# Patient Record
Sex: FEMALE | Race: OTHER | Employment: OTHER | URBAN - METROPOLITAN AREA
[De-identification: names, ages, dates, MRNs, and addresses within clinical notes are randomized per-mention and may not be internally consistent; named-entity substitution may affect disease eponyms.]

---

## 2017-04-05 ENCOUNTER — APPOINTMENT (OUTPATIENT)
Dept: GENERAL RADIOLOGY | Age: 62
DRG: 438 | End: 2017-04-05
Payer: COMMERCIAL

## 2017-04-05 ENCOUNTER — APPOINTMENT (OUTPATIENT)
Dept: ULTRASOUND IMAGING | Age: 62
DRG: 438 | End: 2017-04-05
Payer: COMMERCIAL

## 2017-04-05 ENCOUNTER — HOSPITAL ENCOUNTER (INPATIENT)
Age: 62
LOS: 3 days | Discharge: HOME | DRG: 438 | End: 2017-04-08
Attending: EMERGENCY MEDICINE | Admitting: FAMILY MEDICINE
Payer: COMMERCIAL

## 2017-04-05 DIAGNOSIS — K85.80 OTHER ACUTE PANCREATITIS: Primary | ICD-10-CM

## 2017-04-05 PROBLEM — K85.90 PANCREATITIS, ACUTE: Status: ACTIVE | Noted: 2017-04-05

## 2017-04-05 PROBLEM — E78.5 HYPERLIPIDEMIA: Chronic | Status: ACTIVE | Noted: 2017-04-05

## 2017-04-05 LAB
ALBUMIN SERPL BCP-MCNC: 3.5 G/DL (ref 3.2–4.6)
ALBUMIN SERPL BCP-MCNC: 4.1 G/DL (ref 3.2–4.6)
ALBUMIN/GLOB SERPL: 0.9 {RATIO} (ref 1.2–3.5)
ALBUMIN/GLOB SERPL: 1 {RATIO} (ref 1.2–3.5)
ALP SERPL-CCNC: 60 U/L (ref 50–136)
ALP SERPL-CCNC: 67 U/L (ref 50–136)
ALT SERPL-CCNC: 37 U/L (ref 12–65)
ALT SERPL-CCNC: 46 U/L (ref 12–65)
ANION GAP BLD CALC-SCNC: 11 MMOL/L (ref 7–16)
ANION GAP BLD CALC-SCNC: 9 MMOL/L (ref 7–16)
AST SERPL W P-5'-P-CCNC: 33 U/L (ref 15–37)
AST SERPL W P-5'-P-CCNC: 50 U/L (ref 15–37)
BACTERIA SPEC CULT: NEGATIVE
BACTERIA SPEC CULT: NORMAL
BASOPHILS # BLD AUTO: 0 K/UL (ref 0–0.2)
BASOPHILS # BLD AUTO: 0 K/UL (ref 0–0.2)
BASOPHILS # BLD: 0 % (ref 0–2)
BASOPHILS # BLD: 0 % (ref 0–2)
BILIRUB SERPL-MCNC: 0.2 MG/DL (ref 0.2–1.1)
BILIRUB SERPL-MCNC: 0.3 MG/DL (ref 0.2–1.1)
BUN SERPL-MCNC: 17 MG/DL (ref 8–23)
BUN SERPL-MCNC: 30 MG/DL (ref 8–23)
CALCIUM SERPL-MCNC: 8.3 MG/DL (ref 8.3–10.4)
CALCIUM SERPL-MCNC: 9.5 MG/DL (ref 8.3–10.4)
CHLORIDE SERPL-SCNC: 102 MMOL/L (ref 98–107)
CHLORIDE SERPL-SCNC: 106 MMOL/L (ref 98–107)
CO2 SERPL-SCNC: 24 MMOL/L (ref 21–32)
CO2 SERPL-SCNC: 25 MMOL/L (ref 21–32)
CREAT SERPL-MCNC: 0.63 MG/DL (ref 0.6–1)
CREAT SERPL-MCNC: 0.65 MG/DL (ref 0.6–1)
DIFFERENTIAL METHOD BLD: ABNORMAL
DIFFERENTIAL METHOD BLD: ABNORMAL
EOSINOPHIL # BLD: 0 K/UL (ref 0–0.8)
EOSINOPHIL # BLD: 0.1 K/UL (ref 0–0.8)
EOSINOPHIL NFR BLD: 0 % (ref 0.5–7.8)
EOSINOPHIL NFR BLD: 2 % (ref 0.5–7.8)
ERYTHROCYTE [DISTWIDTH] IN BLOOD BY AUTOMATED COUNT: 14.1 % (ref 11.9–14.6)
ERYTHROCYTE [DISTWIDTH] IN BLOOD BY AUTOMATED COUNT: 14.2 % (ref 11.9–14.6)
GLOBULIN SER CALC-MCNC: 3.8 G/DL (ref 2.3–3.5)
GLOBULIN SER CALC-MCNC: 4.2 G/DL (ref 2.3–3.5)
GLUCOSE SERPL-MCNC: 115 MG/DL (ref 65–100)
GLUCOSE SERPL-MCNC: 127 MG/DL (ref 65–100)
HCT VFR BLD AUTO: 35.7 % (ref 35.8–46.3)
HCT VFR BLD AUTO: 39.9 % (ref 35.8–46.3)
HGB BLD-MCNC: 11.6 G/DL (ref 11.7–15.4)
HGB BLD-MCNC: 12.9 G/DL (ref 11.7–15.4)
IMM GRANULOCYTES # BLD: 0 K/UL (ref 0–0.5)
IMM GRANULOCYTES # BLD: 0 K/UL (ref 0–0.5)
IMM GRANULOCYTES NFR BLD AUTO: 0.2 % (ref 0–5)
IMM GRANULOCYTES NFR BLD AUTO: 0.2 % (ref 0–5)
LACTATE SERPL-SCNC: 0.8 MMOL/L (ref 0.4–2)
LIPASE SERPL-CCNC: 1203 U/L (ref 73–393)
LIPASE SERPL-CCNC: 204 U/L (ref 73–393)
LYMPHOCYTES # BLD AUTO: 10 % (ref 13–44)
LYMPHOCYTES # BLD AUTO: 10 % (ref 13–44)
LYMPHOCYTES # BLD: 0.6 K/UL (ref 0.5–4.6)
LYMPHOCYTES # BLD: 0.6 K/UL (ref 0.5–4.6)
MCH RBC QN AUTO: 28.6 PG (ref 26.1–32.9)
MCH RBC QN AUTO: 28.6 PG (ref 26.1–32.9)
MCHC RBC AUTO-ENTMCNC: 32.3 G/DL (ref 31.4–35)
MCHC RBC AUTO-ENTMCNC: 32.5 G/DL (ref 31.4–35)
MCV RBC AUTO: 88.1 FL (ref 79.6–97.8)
MCV RBC AUTO: 88.5 FL (ref 79.6–97.8)
MONOCYTES # BLD: 0.4 K/UL (ref 0.1–1.3)
MONOCYTES # BLD: 0.4 K/UL (ref 0.1–1.3)
MONOCYTES NFR BLD AUTO: 6 % (ref 4–12)
MONOCYTES NFR BLD AUTO: 7 % (ref 4–12)
NEUTS SEG # BLD: 4.8 K/UL (ref 1.7–8.2)
NEUTS SEG # BLD: 5.3 K/UL (ref 1.7–8.2)
NEUTS SEG NFR BLD AUTO: 81 % (ref 43–78)
NEUTS SEG NFR BLD AUTO: 84 % (ref 43–78)
PLATELET # BLD AUTO: 289 K/UL (ref 150–450)
PLATELET # BLD AUTO: 323 K/UL (ref 150–450)
PMV BLD AUTO: 10.7 FL (ref 10.8–14.1)
PMV BLD AUTO: 11.2 FL (ref 10.8–14.1)
POTASSIUM SERPL-SCNC: 3.4 MMOL/L (ref 3.5–5.1)
POTASSIUM SERPL-SCNC: 4.1 MMOL/L (ref 3.5–5.1)
PROCALCITONIN SERPL-MCNC: <0.1 NG/ML
PROT SERPL-MCNC: 7.3 G/DL (ref 6.3–8.2)
PROT SERPL-MCNC: 8.3 G/DL (ref 6.3–8.2)
RBC # BLD AUTO: 4.05 M/UL (ref 4.05–5.25)
RBC # BLD AUTO: 4.51 M/UL (ref 4.05–5.25)
SERVICE CMNT-IMP: NORMAL
SODIUM SERPL-SCNC: 137 MMOL/L (ref 136–145)
SODIUM SERPL-SCNC: 140 MMOL/L (ref 136–145)
WBC # BLD AUTO: 5.9 K/UL (ref 4.3–11.1)
WBC # BLD AUTO: 6.4 K/UL (ref 4.3–11.1)

## 2017-04-05 PROCEDURE — 85025 COMPLETE CBC W/AUTO DIFF WBC: CPT | Performed by: EMERGENCY MEDICINE

## 2017-04-05 PROCEDURE — 65270000029 HC RM PRIVATE

## 2017-04-05 PROCEDURE — 96375 TX/PRO/DX INJ NEW DRUG ADDON: CPT | Performed by: EMERGENCY MEDICINE

## 2017-04-05 PROCEDURE — 76700 US EXAM ABDOM COMPLETE: CPT

## 2017-04-05 PROCEDURE — 74011250636 HC RX REV CODE- 250/636

## 2017-04-05 PROCEDURE — 74011250636 HC RX REV CODE- 250/636: Performed by: EMERGENCY MEDICINE

## 2017-04-05 PROCEDURE — 99284 EMERGENCY DEPT VISIT MOD MDM: CPT | Performed by: EMERGENCY MEDICINE

## 2017-04-05 PROCEDURE — 84145 PROCALCITONIN (PCT): CPT

## 2017-04-05 PROCEDURE — 83605 ASSAY OF LACTIC ACID: CPT

## 2017-04-05 PROCEDURE — 80053 COMPREHEN METABOLIC PANEL: CPT

## 2017-04-05 PROCEDURE — 96374 THER/PROPH/DIAG INJ IV PUSH: CPT | Performed by: EMERGENCY MEDICINE

## 2017-04-05 PROCEDURE — 87040 BLOOD CULTURE FOR BACTERIA: CPT

## 2017-04-05 PROCEDURE — 74011000250 HC RX REV CODE- 250: Performed by: EMERGENCY MEDICINE

## 2017-04-05 PROCEDURE — 87493 C DIFF AMPLIFIED PROBE: CPT

## 2017-04-05 PROCEDURE — 71020 XR CHEST PA LAT: CPT

## 2017-04-05 PROCEDURE — 74011250636 HC RX REV CODE- 250/636: Performed by: FAMILY MEDICINE

## 2017-04-05 PROCEDURE — 83690 ASSAY OF LIPASE: CPT | Performed by: EMERGENCY MEDICINE

## 2017-04-05 PROCEDURE — 74011000258 HC RX REV CODE- 258

## 2017-04-05 PROCEDURE — 74011000250 HC RX REV CODE- 250: Performed by: FAMILY MEDICINE

## 2017-04-05 PROCEDURE — 81003 URINALYSIS AUTO W/O SCOPE: CPT | Performed by: EMERGENCY MEDICINE

## 2017-04-05 PROCEDURE — 74011250637 HC RX REV CODE- 250/637: Performed by: FAMILY MEDICINE

## 2017-04-05 PROCEDURE — 83690 ASSAY OF LIPASE: CPT

## 2017-04-05 PROCEDURE — 80053 COMPREHEN METABOLIC PANEL: CPT | Performed by: EMERGENCY MEDICINE

## 2017-04-05 RX ORDER — ASPIRIN 81 MG/1
81 TABLET ORAL DAILY
Status: DISCONTINUED | OUTPATIENT
Start: 2017-04-05 | End: 2017-04-07

## 2017-04-05 RX ORDER — ONDANSETRON 2 MG/ML
4 INJECTION INTRAMUSCULAR; INTRAVENOUS ONCE
Status: COMPLETED | OUTPATIENT
Start: 2017-04-05 | End: 2017-04-05

## 2017-04-05 RX ORDER — ATORVASTATIN CALCIUM 10 MG/1
20 TABLET, FILM COATED ORAL
Status: DISCONTINUED | OUTPATIENT
Start: 2017-04-05 | End: 2017-04-07

## 2017-04-05 RX ORDER — OXYCODONE HYDROCHLORIDE 5 MG/1
10 TABLET ORAL
Status: DISCONTINUED | OUTPATIENT
Start: 2017-04-05 | End: 2017-04-08 | Stop reason: HOSPADM

## 2017-04-05 RX ORDER — MORPHINE SULFATE 10 MG/ML
5 INJECTION, SOLUTION INTRAMUSCULAR; INTRAVENOUS
Status: DISCONTINUED | OUTPATIENT
Start: 2017-04-05 | End: 2017-04-08 | Stop reason: HOSPADM

## 2017-04-05 RX ORDER — ACETAMINOPHEN 325 MG/1
650 TABLET ORAL
Status: DISCONTINUED | OUTPATIENT
Start: 2017-04-05 | End: 2017-04-08 | Stop reason: HOSPADM

## 2017-04-05 RX ORDER — ENOXAPARIN SODIUM 100 MG/ML
40 INJECTION SUBCUTANEOUS EVERY 24 HOURS
Status: DISCONTINUED | OUTPATIENT
Start: 2017-04-05 | End: 2017-04-07

## 2017-04-05 RX ORDER — SODIUM CHLORIDE 0.9 % (FLUSH) 0.9 %
5-10 SYRINGE (ML) INJECTION EVERY 8 HOURS
Status: DISCONTINUED | OUTPATIENT
Start: 2017-04-05 | End: 2017-04-08 | Stop reason: HOSPADM

## 2017-04-05 RX ORDER — VANCOMYCIN HYDROCHLORIDE
1250 ONCE
Status: COMPLETED | OUTPATIENT
Start: 2017-04-05 | End: 2017-04-05

## 2017-04-05 RX ORDER — SODIUM CHLORIDE 9 MG/ML
3000 INJECTION, SOLUTION INTRAVENOUS CONTINUOUS
Status: DISCONTINUED | OUTPATIENT
Start: 2017-04-05 | End: 2017-04-07 | Stop reason: ALTCHOICE

## 2017-04-05 RX ORDER — SODIUM CHLORIDE 0.9 % (FLUSH) 0.9 %
5-10 SYRINGE (ML) INJECTION AS NEEDED
Status: DISCONTINUED | OUTPATIENT
Start: 2017-04-05 | End: 2017-04-08 | Stop reason: HOSPADM

## 2017-04-05 RX ORDER — FENOFIBRATE 160 MG/1
160 TABLET ORAL DAILY
Status: DISCONTINUED | OUTPATIENT
Start: 2017-04-05 | End: 2017-04-07

## 2017-04-05 RX ORDER — FAMOTIDINE 10 MG/ML
20 INJECTION INTRAVENOUS
Status: COMPLETED | OUTPATIENT
Start: 2017-04-05 | End: 2017-04-05

## 2017-04-05 RX ORDER — LEVOTHYROXINE SODIUM 100 UG/1
100 TABLET ORAL
Status: DISCONTINUED | OUTPATIENT
Start: 2017-04-05 | End: 2017-04-08 | Stop reason: HOSPADM

## 2017-04-05 RX ORDER — LORAZEPAM 1 MG/1
1 TABLET ORAL
Status: DISCONTINUED | OUTPATIENT
Start: 2017-04-05 | End: 2017-04-08 | Stop reason: HOSPADM

## 2017-04-05 RX ADMIN — Medication 10 ML: at 21:29

## 2017-04-05 RX ADMIN — FAMOTIDINE 20 MG: 10 INJECTION, SOLUTION INTRAVENOUS at 04:16

## 2017-04-05 RX ADMIN — FENOFIBRATE 160 MG: 160 TABLET ORAL at 09:00

## 2017-04-05 RX ADMIN — ACETAMINOPHEN 650 MG: 325 TABLET, FILM COATED ORAL at 15:20

## 2017-04-05 RX ADMIN — ENOXAPARIN SODIUM 40 MG: 40 INJECTION SUBCUTANEOUS at 07:53

## 2017-04-05 RX ADMIN — LEVOTHYROXINE SODIUM 100 MCG: 100 TABLET ORAL at 07:53

## 2017-04-05 RX ADMIN — ACETAMINOPHEN 650 MG: 325 TABLET, FILM COATED ORAL at 20:13

## 2017-04-05 RX ADMIN — ONDANSETRON 4 MG: 2 INJECTION INTRAMUSCULAR; INTRAVENOUS at 04:16

## 2017-04-05 RX ADMIN — SODIUM CHLORIDE 3000 ML: 900 INJECTION, SOLUTION INTRAVENOUS at 07:52

## 2017-04-05 RX ADMIN — VANCOMYCIN HYDROCHLORIDE 1250 MG: 10 INJECTION, POWDER, LYOPHILIZED, FOR SOLUTION INTRAVENOUS at 19:50

## 2017-04-05 RX ADMIN — ATORVASTATIN CALCIUM 20 MG: 10 TABLET, FILM COATED ORAL at 21:28

## 2017-04-05 RX ADMIN — ASPIRIN 81 MG: 81 TABLET, COATED ORAL at 09:00

## 2017-04-05 RX ADMIN — PIPERACILLIN SODIUM,TAZOBACTAM SODIUM 3.38 G: 3; .375 INJECTION, POWDER, FOR SOLUTION INTRAVENOUS at 19:53

## 2017-04-05 RX ADMIN — SODIUM CHLORIDE 12.5 MG: 9 INJECTION INTRAMUSCULAR; INTRAVENOUS; SUBCUTANEOUS at 10:00

## 2017-04-05 NOTE — IP AVS SNAPSHOT
Current Discharge Medication List  
  
START taking these medications Dose & Instructions Dispensing Information Comments Morning Noon Evening Bedtime  
 cefpodoxime 200 mg tablet Commonly known as:  Mireya Quinones Your last dose was: Your next dose is:    
   
   
 Dose:  200 mg Take 1 Tab by mouth two (2) times a day for 3 days. Quantity:  6 Tab Refills:  0  
     
   
   
   
  
 potassium chloride 20 mEq tablet Commonly known as:  K-DUR, KLOR-CON Your last dose was: Your next dose is:    
   
   
 Dose:  40 mEq Take 2 Tabs by mouth daily. Quantity:  5 Tab Refills:  0 CONTINUE these medications which have NOT CHANGED Dose & Instructions Dispensing Information Comments Morning Noon Evening Bedtime  
 acetaminophen 500 mg tablet Commonly known as:  TYLENOL Your last dose was: Your next dose is: Take  by mouth every six (6) hours as needed for Pain. Refills:  0  
     
   
   
   
  
 aspirin delayed-release 81 mg tablet Commonly known as:  ASPIRIN LOW DOSE Your last dose was: Your next dose is:    
   
   
 Dose:  81 mg Take 1 Tab by mouth daily. Quantity:  90 Tab Refills:  12  
     
   
   
   
  
 atorvastatin 20 mg tablet Commonly known as:  LIPITOR Your last dose was: Your next dose is:    
   
   
 Dose:  20 mg Take 1 Tab by mouth daily. Quantity:  90 Tab Refills:  3  
     
   
   
   
  
 fenofibrate 160 mg tablet Commonly known as:  LOFIBRA Your last dose was: Your next dose is: TAKE 1 TABLET BY MOUTH EVERY DAY Quantity:  90 Tab Refills:  4  
     
   
   
   
  
 levothyroxine 100 mcg tablet Commonly known as:  SYNTHROID Your last dose was: Your next dose is:    
   
   
 Dose:  100 mcg Take 1 Tab by mouth Daily (before breakfast). Quantity:  90 Tab Refills:  3 STOP taking these medications   
 amoxicillin-clavulanate 875-125 mg per tablet Commonly known as:  AUGMENTIN Where to Get Your Medications Information on where to get these meds will be given to you by the nurse or doctor. ! Ask your nurse or doctor about these medications  
  cefpodoxime 200 mg tablet  
 potassium chloride 20 mEq tablet

## 2017-04-05 NOTE — PROGRESS NOTES
Pt's temperature was elevated 101 administered tylenol 650 mg PO per MD order, will continue to monitor.

## 2017-04-05 NOTE — PROGRESS NOTES
Rechecked temp now higher than before 101.7, MD notified no new orders received at this time. Will continue to monitor.

## 2017-04-05 NOTE — PROGRESS NOTES
Pt assessment completed. Alert and oriented. Lungs CTA even and unlabored. Heart sounds regular. Abdomen soft and tender with active bowel sounds. IV present and infusing. PT denies pain needs at this time. Oriented to room, call light close, bed low, breaks on. Pt aware to call for assistance. All needs met at this time. Will continue to monitor.

## 2017-04-05 NOTE — PROGRESS NOTES
Problem: Interdisciplinary Rounds  Goal: Interdisciplinary Rounds  Interdisciplinary team rounds were held 4/5/2017 with the following team members:Care Management, Nursing, Nurse Practitioner, Nutrition and Pharmacy and the patient and child(gabriela). Plan of care discussed. See clinical pathway and/or care plan for interventions and desired outcomes.

## 2017-04-05 NOTE — ED NOTES
TRANSFER - OUT REPORT:    Verbal report given to [facility] rn(name) on [place]  being transferred to 343(unit) for ordered procedure       Report consisted of patients Situation, Background, Assessment and   Recommendations(SBAR). Information from the following report(s) ED Summary, Procedure Summary and Intake/Output was reviewed with the receiving nurse. Lines:       Opportunity for questions and clarification was provided.       Patient transported with:   Registered Nurse

## 2017-04-05 NOTE — H&P
HOSPITALIST H&P/CONSULT  NAME:  Nataly Ahmadi   Age:  59 y.o.  :   1955   MRN:   [id number]  PCP: Dinorah Alicia MD  Treatment Team: Attending Provider: Elaine Galvan MD; Primary Nurse: Holly Felipe RN    No Order     CC: Reason for admission is: pancreatitis    HPI:   Patient case was reviewed with the ER provider prior to seeing the patient. Patient is a 59 y.o. female who presents to the ER due to abdominal pain with n/v.  Started about 7pm and just worsened. She states she could not even keep water down. Has had a cold for a couple days but otherwise in usual state of health. ER w/u shows pancreatitis. She has not had this before. She does not drink alcohol. She does have high cholesterol and is on meds for this. Denies fever/chills. ROS:  All systems have been reviewed and are negative except as stated in HPI or elsewhere. Past Medical History:   Diagnosis Date    Thyroid disease       Past Surgical History:   Procedure Laterality Date    HX GYN      left ovarian cyst    HX ORTHOPAEDIC      right shoulder/ rotator cuff      Social History   Substance Use Topics    Smoking status: Never Smoker    Smokeless tobacco: Never Used    Alcohol use No      Family History   Problem Relation Age of Onset    Heart Disease Brother     Hypertension Mother     Cancer Father      neck/ throat       FH Reviewed and non-contributory to admitting diagnosis    No Known Allergies   Prior to Admission Medications   Prescriptions Last Dose Informant Patient Reported? Taking?   acetaminophen (TYLENOL) 500 mg tablet   Yes No   Sig: Take  by mouth every six (6) hours as needed for Pain.   amoxicillin-clavulanate (AUGMENTIN) 875-125 mg per tablet   No No   Sig: Take 1 Tab by mouth every twelve (12) hours. aspirin delayed-release (ASPIRIN LOW DOSE) 81 mg tablet 2017 at Unknown time  No Yes   Sig: Take 1 Tab by mouth daily.    atorvastatin (LIPITOR) 20 mg tablet 2017 at Unknown time  No Yes   Sig: Take 1 Tab by mouth daily. fenofibrate (LOFIBRA) 160 mg tablet 2017 at Unknown time  No Yes   Sig: TAKE 1 TABLET BY MOUTH EVERY DAY   levothyroxine (SYNTHROID) 100 mcg tablet 2017 at Unknown time  No Yes   Sig: Take 1 Tab by mouth Daily (before breakfast). Facility-Administered Medications: None         Objective:     Visit Vitals    /54    Pulse 94    Temp 98.6 °F (37 °C)    Resp 18    Ht 5' (1.524 m)    Wt 68 kg (150 lb)    SpO2 95%    BMI 29.29 kg/m2      Temp (24hrs), Av.6 °F (37 °C), Min:98.6 °F (37 °C), Max:98.6 °F (37 °C)    Oxygen Therapy  O2 Sat (%): 95 % (17 0420)  Pulse via Oximetry: 91 beats per minute (17 0420)  O2 Device: Room air (17 0327)   Body mass index is 29.29 kg/(m^2). Physical Exam:    General:    WD and WN , No apparent distress. Head:   Normocephalic, without obvious abnormality, atraumatic. Eyes:  PERRL; EOMI  ENT:  Hearing is normal.  Oropharynx is clear with tacky mucous membranes   Resp:    Clear to auscultation bilaterally. No Wheezing or Rhonchi. Resp are even and unlabored  Heart[de-identified]  Regular rate and rhythm,  no murmur, rub or gallop. No LE edema  Abdomen:   Soft, non-tender. Not distended. Bowel sounds normal.  hepato-splenomegaly -none  Musc/SK: Muscle strength and tone normal and appropriate for age and condition. No cyanosis. No clubbing  Skin:     Texture, turgor normal. No significant rashes or lesions. Neurologic: CN II - XII are tested and intact;   Reflexes unobtainable  Psych: Alert and oriented x 4;  Judgement and insight are normal     Data Review:   Recent Results (from the past 24 hour(s))   CBC WITH AUTOMATED DIFF    Collection Time: 17  3:40 AM   Result Value Ref Range    WBC 5.9 4.3 - 11.1 K/uL    RBC 4.51 4.05 - 5.25 M/uL    HGB 12.9 11.7 - 15.4 g/dL    HCT 39.9 35.8 - 46.3 %    MCV 88.5 79.6 - 97.8 FL    MCH 28.6 26.1 - 32.9 PG    MCHC 32.3 31.4 - 35.0 g/dL    RDW 14.2 11.9 - 14.6 %    PLATELET 317 312 - 128 K/uL    MPV 11.2 10.8 - 14.1 FL    DF AUTOMATED      NEUTROPHILS 81 (H) 43 - 78 %    LYMPHOCYTES 10 (L) 13 - 44 %    MONOCYTES 7 4.0 - 12.0 %    EOSINOPHILS 2 0.5 - 7.8 %    BASOPHILS 0 0.0 - 2.0 %    IMMATURE GRANULOCYTES 0.2 0.0 - 5.0 %    ABS. NEUTROPHILS 4.8 1.7 - 8.2 K/UL    ABS. LYMPHOCYTES 0.6 0.5 - 4.6 K/UL    ABS. MONOCYTES 0.4 0.1 - 1.3 K/UL    ABS. EOSINOPHILS 0.1 0.0 - 0.8 K/UL    ABS. BASOPHILS 0.0 0.0 - 0.2 K/UL    ABS. IMM. GRANS. 0.0 0.0 - 0.5 K/UL   METABOLIC PANEL, COMPREHENSIVE    Collection Time: 04/05/17  3:40 AM   Result Value Ref Range    Sodium 137 136 - 145 mmol/L    Potassium 4.1 3.5 - 5.1 mmol/L    Chloride 102 98 - 107 mmol/L    CO2 24 21 - 32 mmol/L    Anion gap 11 7 - 16 mmol/L    Glucose 127 (H) 65 - 100 mg/dL    BUN 17 8 - 23 MG/DL    Creatinine 0.63 0.6 - 1.0 MG/DL    GFR est AA >60 >60 ml/min/1.73m2    GFR est non-AA >60 >60 ml/min/1.73m2    Calcium 9.5 8.3 - 10.4 MG/DL    Bilirubin, total 0.3 0.2 - 1.1 MG/DL    ALT (SGPT) 37 12 - 65 U/L    AST (SGOT) 33 15 - 37 U/L    Alk. phosphatase 67 50 - 136 U/L    Protein, total 8.3 (H) 6.3 - 8.2 g/dL    Albumin 4.1 3.2 - 4.6 g/dL    Globulin 4.2 (H) 2.3 - 3.5 g/dL    A-G Ratio 1.0 (L) 1.2 - 3.5     LIPASE    Collection Time: 04/05/17  3:40 AM   Result Value Ref Range    Lipase 1203 (H) 73 - 393 U/L     CXR Results  (Last 48 hours)    None        CT Results  (Last 48 hours)    None          Assessment and Plan: Active Hospital Problems    Diagnosis Date Noted    Pancreatitis, acute 04/05/2017         · PLAN   · IVF  · NPO except meds and few ice chips  · Cont appropriate home meds (see MAR)  · Control symptoms (pain, n/v, fever, etc)  · Monitor appropriate labs   · Plans discussed with patient and/or caregiver; questions answered.     Med records reviewed if applicable; findings:     Critical care time if applicable:      Signed By: Bobby Tanner MD     April 5, 2017

## 2017-04-05 NOTE — PROGRESS NOTES
Primary Nurse Tasneem Lance and Delfino Hamman, RN performed a dual skin assessment on this patient No impairment noted  Du score is 21

## 2017-04-05 NOTE — PROGRESS NOTES
Pharmacokinetic Consult to Pharmacist    Ra Nik is a 59 y.o. female being treated for sepsis with Vancomycin and Zosyn. @OSTW(44)@  @LIRP34)@  Lab Results   Component Value Date/Time    BUN 17 04/05/2017 03:40 AM    Creatinine 0.63 04/05/2017 03:40 AM    WBC 5.9 04/05/2017 03:40 AM      Estimated Creatinine Clearance: 78.6 mL/min (based on Cr of 0.63). CULTURES:  All Micro Results       Procedure Component Value Units Date/Time      CULTURE, BLOOD [[id number]]     Order Status:  Sent Specimen:  Blood     CULTURE, BLOOD [[id number]]     Order Status:  Sent Specimen:  Blood     C. DIFFICILE/EPI PCR [[id number]] Collected:  04/05/17 1429    Order Status:  Completed Specimen:  Stool Updated:  04/05/17 1522    C. DIFFICILE/EPI PCR [[id number]]     Order Status:  Canceled Specimen:  Stool     CLOSTRIDIUM DIFF TOXIN A & B [[id number]]     Order Status:  Canceled Specimen:  Stool               Day 1 of vancomycin. Goal trough is 15 - 20. Vancomycin dose initiated at 1250 mg IV once, then 1 gram IV every 12 hours. Will continue to follow patient.       Thank you,  Alfred GeeD, BCPS

## 2017-04-05 NOTE — ED PROVIDER NOTES
HPI Comments: Patient states onset of nausea and vomiting at 7 pm. Cramping abdominal pain. Diarrhea - multiple episodes watery stool. No blood in stool or emesis. No urinary symptoms. No fever. Has had URI symptoms for 2 days with cough, hoarse voice, sore throat. History of PUD many years ago. No history of pancreatitis. Has had cholecystectomy and appendectomy. No alcohol or tobacco.     Patient is a 59 y.o. female presenting with vomiting. The history is provided by the patient. Vomiting    This is a new problem. Episode onset: 7 pm. The problem occurs 2 to 4 times per day. The problem has not changed since onset. The emesis has an appearance of stomach contents. There has been no fever. Associated symptoms include abdominal pain, diarrhea, cough and URI. Pertinent negatives include no chills and no fever. The patient is not pregnant. Past Medical History:   Diagnosis Date    Thyroid disease        Past Surgical History:   Procedure Laterality Date    HX GYN      left ovarian cyst    HX ORTHOPAEDIC      right shoulder/ rotator cuff         Family History:   Problem Relation Age of Onset    Heart Disease Brother     Hypertension Mother     Cancer Father      neck/ throat       Social History     Social History    Marital status:      Spouse name: N/A    Number of children: N/A    Years of education: N/A     Occupational History    Not on file. Social History Main Topics    Smoking status: Never Smoker    Smokeless tobacco: Never Used    Alcohol use No    Drug use: No    Sexual activity: Not on file     Other Topics Concern    Not on file     Social History Narrative         ALLERGIES: Review of patient's allergies indicates no known allergies. Review of Systems   Constitutional: Negative. Negative for chills and fever. HENT: Positive for congestion, sore throat and voice change. Eyes: Negative. Respiratory: Positive for cough. Cardiovascular: Negative. Gastrointestinal: Positive for abdominal pain, diarrhea, nausea and vomiting. Genitourinary: Positive for decreased urine volume. Negative for dysuria. Musculoskeletal: Negative. Neurological: Negative. Hematological: Negative. Psychiatric/Behavioral: Negative. Vitals:    04/05/17 0327 04/05/17 0420   BP: 109/51 119/54   Pulse: 94    Resp: 20    Temp: 98.6 °F (37 °C)    SpO2: 98% 95%   Weight: 68 kg (150 lb)    Height: 5' (1.524 m)             Physical Exam   Constitutional: She is oriented to person, place, and time. She appears well-developed and well-nourished. HENT:   Head: Normocephalic and atraumatic. Right Ear: External ear normal.   Left Ear: External ear normal.   MM dry   Eyes: Conjunctivae and EOM are normal. Pupils are equal, round, and reactive to light. No scleral icterus. Neck: Normal range of motion. Neck supple. No JVD present. Cardiovascular: Normal rate, regular rhythm, normal heart sounds and intact distal pulses. Pulmonary/Chest: Effort normal and breath sounds normal.   Abdominal: Soft. Bowel sounds are normal. She exhibits no distension and no mass. There is tenderness (epigastric area). There is no rebound and no guarding. Musculoskeletal: Normal range of motion. She exhibits no edema or tenderness. Neurological: She is alert and oriented to person, place, and time. Skin: Skin is warm and dry. Psychiatric: She has a normal mood and affect. Her behavior is normal.   Nursing note and vitals reviewed.        MDM  ED Course       Procedures    Acute pancreatitis  IVF's 1 liter NS, Zofran 4 mg IV, Pepcid 20 mg IV  Labs reviewed  Has not urinated yet  Consult hospitalist - Dr. Alisha Townsend - will see

## 2017-04-05 NOTE — PROGRESS NOTES
Admission assessment complete via doc flow sheet. Patient is alert and oriented x 4. Respirations even and unlabored on room air. Lung sounds CTA bilaterally. Heart sounds S1, S2 auscultated and regular. Abdomen soft and non tender. Bowel sounds active to all 4 quadrants. Patient reported having diarrhea. IV flushed without difficulty. Skin is warm and dry with ecchymosis noted to right knee. Patient ambulates to bathroom as needed without difficulty. Patient denies pain and other needs at this time. Patient is oriented to room. Bed is locked and in low position. Bed rails x 3. Patient is encouraged to call for assistance. Call light within reach.

## 2017-04-05 NOTE — IP AVS SNAPSHOT
Carley Knight 
 
 
 [address] [place] [address] 
[phone] Patient: Terressa Castleman MRN: [id number] TMA:[id number] You are allergic to the following No active allergies Recent Documentation Height Weight BMI OB Status Smoking Status 1.524 m 68 kg 29.29 kg/m2 Menopause Never Smoker Unresulted Labs Order Current Status CULTURE, BLOOD Preliminary result CULTURE, BLOOD Preliminary result Emergency Contacts Name Discharge Info Relation Home Work Mobile Deborah Turner  Spouse [3]   [phone] About your hospitalization You were admitted on:  April 5, 2017 You last received care in the:  [address] You were discharged on:  April 8, 2017 Unit phone number:  [phone] Why you were hospitalized Your primary diagnosis was:  Pancreatitis, Acute Your diagnoses also included:  Sepsis (Hcc), Uti (Urinary Tract Infection), Hypokalemia, Hypomagnesemia Providers Seen During Your Hospitalizations Provider Role Specialty Primary office phone Myah Hunter MD Attending Provider Emergency Medicine [phone] Hortensia Woodward MD Attending Provider [facility] [phone] Oniel Villatoro DO Attending Provider Internal Medicine [phone] Your Primary Care Physician (PCP) Primary Care Physician Office Phone Office Fax Angela Worley [place] [phone] Follow-up Information Follow up With Details Comments Contact Info Sandrita Gallagher MD In 2 days  [address] [facility] [address]50 [address] [facility] [address] [phone] [facility] In 1 week  [facility] [address] [phone] Current Discharge Medication List  
  
START taking these medications Dose & Instructions Dispensing Information Comments Morning Noon Evening Bedtime  
 cefpodoxime 200 mg tablet Commonly known as:  Daniela Barthel Your last dose was: Your next dose is:    
   
   
 Dose:  200 mg Take 1 Tab by mouth two (2) times a day for 3 days. Quantity:  6 Tab Refills:  0  
     
   
   
   
  
 potassium chloride 20 mEq tablet Commonly known as:  K-DUR, KLOR-CON Your last dose was: Your next dose is:    
   
   
 Dose:  40 mEq Take 2 Tabs by mouth daily. Quantity:  5 Tab Refills:  0 CONTINUE these medications which have NOT CHANGED Dose & Instructions Dispensing Information Comments Morning Noon Evening Bedtime  
 acetaminophen 500 mg tablet Commonly known as:  TYLENOL Your last dose was: Your next dose is: Take  by mouth every six (6) hours as needed for Pain. Refills:  0  
     
   
   
   
  
 aspirin delayed-release 81 mg tablet Commonly known as:  ASPIRIN LOW DOSE Your last dose was: Your next dose is:    
   
   
 Dose:  81 mg Take 1 Tab by mouth daily. Quantity:  90 Tab Refills:  12  
     
   
   
   
  
 atorvastatin 20 mg tablet Commonly known as:  LIPITOR Your last dose was: Your next dose is:    
   
   
 Dose:  20 mg Take 1 Tab by mouth daily. Quantity:  90 Tab Refills:  3  
     
   
   
   
  
 fenofibrate 160 mg tablet Commonly known as:  LOFIBRA Your last dose was: Your next dose is: TAKE 1 TABLET BY MOUTH EVERY DAY Quantity:  90 Tab Refills:  4  
     
   
   
   
  
 levothyroxine 100 mcg tablet Commonly known as:  SYNTHROID Your last dose was: Your next dose is:    
   
   
 Dose:  100 mcg Take 1 Tab by mouth Daily (before breakfast). Quantity:  90 Tab Refills:  3 STOP taking these medications   
 amoxicillin-clavulanate 875-125 mg per tablet Commonly known as:  AUGMENTIN  
   
  
  
  
 Where to Get Your Medications Information on where to get these meds will be given to you by the nurse or doctor. ! Ask your nurse or doctor about these medications  
  cefpodoxime 200 mg tablet  
 potassium chloride 20 mEq tablet Discharge Instructions DISCHARGE SUMMARY from Nurse The following personal items are in your possession at time of discharge: 
 
  
Visual Aid: None Clothing: At bedside PATIENT INSTRUCTIONS: 
 
After general anesthesia or intravenous sedation, for 24 hours or while taking prescription Narcotics: · Limit your activities · Do not drive and operate hazardous machinery · Do not make important personal or business decisions · Do  not drink alcoholic beverages · If you have not urinated within 8 hours after discharge, please contact your surgeon on call. Report the following to your surgeon: 
· Excessive pain, swelling, redness or odor of or around the surgical area · Temperature over 100.5 · Nausea and vomiting lasting longer than 4 hours or if unable to take medications · Any signs of decreased circulation or nerve impairment to extremity: change in color, persistent  numbness, tingling, coldness or increase pain · Any questions What to do at Home: 
Recommended activity: Activity as tolerated If you experience any of the following symptoms please see discharge instructions, please follow up with your primary care MD. 
 
 
*  Please give a list of your current medications to your Primary Care Provider. *  Please update this list whenever your medications are discontinued, doses are 
    changed, or new medications (including over-the-counter products) are added. *  Please carry medication information at all times in case of emergency situations. These are general instructions for a healthy lifestyle: No smoking/ No tobacco products/ Avoid exposure to second hand smoke Surgeon General's Warning:  Quitting smoking now greatly reduces serious risk to your health. Obesity, smoking, and sedentary lifestyle greatly increases your risk for illness A healthy diet, regular physical exercise & weight monitoring are important for maintaining a healthy lifestyle You may be retaining fluid if you have a history of heart failure or if you experience any of the following symptoms:  Weight gain of 3 pounds or more overnight or 5 pounds in a week, increased swelling in our hands or feet or shortness of breath while lying flat in bed. Please call your doctor as soon as you notice any of these symptoms; do not wait until your next office visit. Recognize signs and symptoms of STROKE: 
 
F-face looks uneven A-arms unable to move or move unevenly S-speech slurred or non-existent T-time-call 911 as soon as signs and symptoms begin-DO NOT go Back to bed or wait to see if you get better-TIME IS BRAIN. Warning Signs of HEART ATTACK Call 911 if you have these symptoms: 
? Chest discomfort. Most heart attacks involve discomfort in the center of the chest that lasts more than a few minutes, or that goes away and comes back. It can feel like uncomfortable pressure, squeezing, fullness, or pain. ? Discomfort in other areas of the upper body. Symptoms can include pain or discomfort in one or both arms, the back, neck, jaw, or stomach. ? Shortness of breath with or without chest discomfort. ? Other signs may include breaking out in a cold sweat, nausea, or lightheadedness. Don't wait more than five minutes to call [address]! Fast action can save your life. Calling 911 is almost always the fastest way to get lifesaving treatment. Emergency Medical Services staff can begin treatment when they arrive  up to an hour sooner than if someone gets to the hospital by car. The discharge information has been reviewed with the patient.   The patient verbalized understanding. Discharge medications reviewed with the patient and appropriate educational materials and side effects teaching were provided. Discharge Instructions Attachments/References PANCREATITIS (ENGLISH) DIARRHEA (ENGLISH) UTI (URINARY TRACT INFECTION): FEMALE (ENGLISH) Discharge Orders None [employer] Announcement We are excited to announce that we are making your provider's discharge notes available to you in [employer]. You will see these notes when they are completed and signed by the physician that discharged you from your recent hospital stay. If you have any questions or concerns about any information you see in [employer], please call the Health Information Department where you were seen or reach out to your Primary Care Provider for more information about your plan of care. Introducing [facility]! [address] introduces [employer] patient portal. Now you can access parts of your medical record, email your doctor's office, and request medication refills online. 1. In your internet browser, go to [email or web address] 2. Click on the First Time User? Click Here link in the Sign In box. You will see the New Member Sign Up page. 3. Enter your [employer] Access Code exactly as it appears below. You will not need to use this code after youve completed the sign-up process. If you do not sign up before the expiration date, you must request a new code. · [employer] Access Code: [email or web address] Expires: 7/4/2017  3:13 AM 
 
4. Enter the last four digits of your Social Security Number (xxxx) and Date of Birth (mm/dd/yyyy) as indicated and click Submit. You will be taken to the next sign-up page. 5. Create a Avensot ID. This will be your [employer] login ID and cannot be changed, so think of one that is secure and easy to remember. 6. Create a Avensot password. You can change your password at any time. 7. Enter your Password Reset Question and Answer. This can be used at a later time if you forget your password. 8. Enter your e-mail address. You will receive e-mail notification when new information is available in [address] 9. Click Sign Up. You can now view and download portions of your medical record. 10. Click the Download Summary menu link to download a portable copy of your medical information. If you have questions, please visit the Frequently Asked Questions section of the [employer] website. Remember, [employer] is NOT to be used for urgent needs. For medical emergencies, dial 911. Now available from your iPhone and Android! General Information Please provide this summary of care documentation to your next provider. Patient Signature:  ____________________________________________________________ Date:  ____________________________________________________________  
  
Melo End Provider Signature:  ____________________________________________________________ Date:  ____________________________________________________________ More Information Pancreatitis: Care Instructions Your Care Instructions The pancreas is an organ behind the stomach. It makes hormones and enzymes to help your body digest food. But if these enzymes attack the pancreas, it can get inflamed. This is called pancreatitis. Most cases are caused by gallstones or by heavy alcohol use. If you take care of yourself at home, it will help you get better. It will also help you avoid more problems with your pancreas. Follow-up care is a key part of your treatment and safety. Be sure to make and go to all appointments, and call your doctor if you are having problems. It's also a good idea to know your test results and keep a list of the medicines you take. How can you care for yourself at home? · Drink clear liquids and eat bland foods until you feel better.  Harsh [place]arez foods include rice, dry toast, and crackers. They also include bananas and applesauce. · Eat a low-fat diet until your doctor says your pancreas is healed. · Do not drink alcohol. Tell your doctor if you need help to quit. Counseling, support groups, and sometimes medicines can help you stay sober. · Be safe with medicines. Read and follow all instructions on the label. ¨ If the doctor gave you a prescription medicine for pain, take it as prescribed. ¨ If you are not taking a prescription pain medicine, ask your doctor if you can take an over-the-counter medicine. · If your doctor prescribed antibiotics, take them as directed. Do not stop taking them just because you feel better. You need to take the full course of antibiotics. · Get extra rest until you feel better. To prevent future problems with your pancreas · Do not drink alcohol. · Tell your doctors and pharmacist that you've had pancreatitis. They can help you avoid medicines that may cause this problem again. When should you call for help? Call your doctor now or seek immediate medical care if: 
· You have new or severe belly pain. · You have a new or higher fever. · You can't keep fluid or medicines down. Watch closely for changes in your health, and be sure to contact your doctor if: · The symptoms you had when you first started feeling sick come back. · You do not get better as expected. · You need help to stop drinking alcohol. Where can you learn more? Go to http://maninder-jorge l.info/. Enter O082 in the search box to learn more about \"Pancreatitis: Care Instructions. \" Current as of: August 9, 2016 Content Version: 11.2 © 2645-1091 Sweeten. Care instructions adapted under license by [employer]ill[employer] (which disclaims liability or warranty for this information).  If you have questions about a medical condition or this instruction, always ask your healthcare professional. Eric Sims, [facility] disclaims any warranty or liability for your use of this information. Diarrhea: Care Instructions Your Care Instructions Diarrhea is loose, watery stools (bowel movements). The exact cause is often hard to find. Sometimes diarrhea is your body's way of getting rid of what caused an upset stomach. Viruses, food poisoning, and many medicines can cause diarrhea. Some people get diarrhea in response to emotional stress, anxiety, or certain foods. Almost everyone has diarrhea now and then. It usually isn't serious, and your stools will return to normal soon. The important thing to do is replace the fluids you have lost, so you can prevent dehydration. The doctor has checked you carefully, but problems can develop later. If you notice any problems or new symptoms, get medical treatment right away. Follow-up care is a key part of your treatment and safety. Be sure to make and go to all appointments, and call your doctor if you are having problems. It's also a good idea to know your test results and keep a list of the medicines you take. How can you care for yourself at home? · Watch for signs of dehydration, which means your body has lost too much water. Dehydration is a serious condition and should be treated right away. Signs of dehydration are: 
¨ Increasing thirst and dry eyes and mouth. ¨ Feeling faint or lightheaded. ¨ Darker urine, and a smaller amount of urine than normal. 
· To prevent dehydration, drink plenty of fluids, enough so that your urine is light yellow or clear like water. Choose water and other caffeine-free clear liquids until you feel better. If you have kidney, heart, or liver disease and have to limit fluids, talk with your doctor before you increase the amount of fluids you drink. · Begin eating small amounts of mild foods the next day, if you feel like it. ¨ Try yogurt that has live cultures of Lactobacillus. (Check the label.) ¨ Avoid spicy foods, fruits, alcohol, and caffeine until 48 hours after all symptoms are gone. ¨ Avoid chewing gum that contains sorbitol. ¨ Avoid dairy products (except for yogurt with Lactobacillus) while you have diarrhea and for 3 days after symptoms are gone. · The doctor may recommend that you take over-the-counter medicine, such as loperamide (Imodium), if you still have diarrhea after 6 hours. Read and follow all instructions on the label. Do not use this medicine if you have bloody diarrhea, a high fever, or other signs of serious illness. Call your doctor if you think you are having a problem with your medicine. When should you call for help? Call 911 anytime you think you may need emergency care. For example, call if: 
· You passed out (lost consciousness). · Your stools are maroon or very bloody. Call your doctor now or seek immediate medical care if: 
· You are dizzy or lightheaded, or you feel like you may faint. · Your stools are black and look like tar, or they have streaks of blood. · You have new or worse belly pain. · You have symptoms of dehydration, such as: ¨ Dry eyes and a dry mouth. ¨ Passing only a little dark urine. ¨ Feeling thirstier than usual. 
· You have a new or higher fever. Watch closely for changes in your health, and be sure to contact your doctor if: 
· Your diarrhea is getting worse. · You see pus in the diarrhea. · You are not getting better after 2 days (48 hours). Where can you learn more? Go to http://maninder-jorge l.info/. Enter L137 in the search box to learn more about \"Diarrhea: Care Instructions. \" Current as of: May 27, 2016 Content Version: 11.2 © 0568-6252 [employer]. Care instructions adapted under license by Ritot (which disclaims liability or warranty for this information).  If you have questions about a medical condition or this instruction, always ask your healthcare professional. [employer] disclaims any warranty or liability for your use of this information. Urinary Tract Infection in Women: Care Instructions Your Care Instructions A urinary tract infection, or UTI, is a general term for an infection anywhere between the kidneys and the urethra (where urine comes out). Most UTIs are bladder infections. They often cause pain or burning when you urinate. UTIs are caused by bacteria and can be cured with antibiotics. Be sure to complete your treatment so that the infection goes away. Follow-up care is a key part of your treatment and safety. Be sure to make and go to all appointments, and call your doctor if you are having problems. It's also a good idea to know your test results and keep a list of the medicines you take. How can you care for yourself at home? · Take your antibiotics as directed. Do not stop taking them just because you feel better. You need to take the full course of antibiotics. · Drink extra water and other fluids for the next day or two. This may help wash out the bacteria that are causing the infection. (If you have kidney, heart, or liver disease and have to limit fluids, talk with your doctor before you increase your fluid intake.) · Avoid drinks that are carbonated or have caffeine. They can irritate the bladder. · Urinate often. Try to empty your bladder each time. · To relieve pain, take a hot bath or lay a heating pad set on low over your lower belly or genital area. Never go to sleep with a heating pad in place. To prevent UTIs · Drink plenty of water each day. This helps you urinate often, which clears bacteria from your system. (If you have kidney, heart, or liver disease and have to limit fluids, talk with your doctor before you increase your fluid intake.) · Urinate when you need to. · Urinate right after you have sex. · Change sanitary pads often.  
· Avoid douches, bubble baths, feminine hygiene sprays, and other feminine hygiene products that have deodorants. · After going to the bathroom, wipe from front to back. When should you call for help? Call your doctor now or seek immediate medical care if: · Symptoms such as fever, chills, nausea, or vomiting get worse or appear for the first time. · You have new pain in your back just below your rib cage. This is called flank pain. · There is new blood or pus in your urine. · You have any problems with your antibiotic medicine. Watch closely for changes in your health, and be sure to contact your doctor if: 
· You are not getting better after taking an antibiotic for 2 days. · Your symptoms go away but then come back. Where can you learn more? Go to http://maninder-jorge l.info/. Enter M668 in the search box to learn more about \"Urinary Tract Infection in Women: Care Instructions. \" Current as of: November 28, 2016 Content Version: 11.2 © 6490-1473 Nazar. Care instructions adapted under license by [employer] (which disclaims liability or warranty for this information). If you have questions about a medical condition or this instruction, always ask your healthcare professional. [address] any warranty or liability for your use of this information.

## 2017-04-05 NOTE — PROGRESS NOTES
TRANSFER - IN REPORT:    Verbal report received from Nikki Naik (name) on Timur Tijerina  being received from ED (unit) for routine progression of care      Report consisted of patients Situation, Background, Assessment and   Recommendations(SBAR). Information from the following report(s) SBAR, Kardex, ED Summary, Intake/Output, MAR, Accordion and Recent Results was reviewed with the receiving nurse. Opportunity for questions and clarification was provided. Assessment completed upon patients arrival to unit and care assumed.

## 2017-04-05 NOTE — PROGRESS NOTES
Pt c/o nausea after getting up to use the bathroom. Administered phenergan 12.5 mg Iv per MD order, will continue to monitor.

## 2017-04-05 NOTE — PROGRESS NOTES
Hospitalist Progress Note    2017  Admit Date: 2017  3:26 AM   NAME: Keely Moreland   :  1955   MRN:  [id number]   Attending: Armen Dias MD  PCP:  Shira Levine MD  Treatment Team: Attending Provider: Armen Dias MD    Full Code   SUBJECTIVE:   Keely Moreland is a 59 y.o. female admitted with acute pancreatitis   Today, she reports 5/10 dull upper abdominal pain that is unchanged. She has diarrhea which she reports is chronic and unchanged. She denies cough, dyspnea, or dysuria      Past Medical History:   Diagnosis Date    Thyroid disease      Recent Results (from the past 24 hour(s))   CBC WITH AUTOMATED DIFF    Collection Time: 17  3:40 AM   Result Value Ref Range    WBC 5.9 4.3 - 11.1 K/uL    RBC 4.51 4.05 - 5.25 M/uL    HGB 12.9 11.7 - 15.4 g/dL    HCT 39.9 35.8 - 46.3 %    MCV 88.5 79.6 - 97.8 FL    MCH 28.6 26.1 - 32.9 PG    MCHC 32.3 31.4 - 35.0 g/dL    RDW 14.2 11.9 - 14.6 %    PLATELET 222 112 - 366 K/uL    MPV 11.2 10.8 - 14.1 FL    DF AUTOMATED      NEUTROPHILS 81 (H) 43 - 78 %    LYMPHOCYTES 10 (L) 13 - 44 %    MONOCYTES 7 4.0 - 12.0 %    EOSINOPHILS 2 0.5 - 7.8 %    BASOPHILS 0 0.0 - 2.0 %    IMMATURE GRANULOCYTES 0.2 0.0 - 5.0 %    ABS. NEUTROPHILS 4.8 1.7 - 8.2 K/UL    ABS. LYMPHOCYTES 0.6 0.5 - 4.6 K/UL    ABS. MONOCYTES 0.4 0.1 - 1.3 K/UL    ABS. EOSINOPHILS 0.1 0.0 - 0.8 K/UL    ABS. BASOPHILS 0.0 0.0 - 0.2 K/UL    ABS. IMM.  GRANS. 0.0 0.0 - 0.5 K/UL   METABOLIC PANEL, COMPREHENSIVE    Collection Time: 17  3:40 AM   Result Value Ref Range    Sodium 137 136 - 145 mmol/L    Potassium 4.1 3.5 - 5.1 mmol/L    Chloride 102 98 - 107 mmol/L    CO2 24 21 - 32 mmol/L    Anion gap 11 7 - 16 mmol/L    Glucose 127 (H) 65 - 100 mg/dL    BUN 17 8 - 23 MG/DL    Creatinine 0.63 0.6 - 1.0 MG/DL    GFR est AA >60 >60 ml/min/1.73m2    GFR est non-AA >60 >60 ml/min/1.73m2    Calcium 9.5 8.3 - 10.4 MG/DL    Bilirubin, total 0.3 0.2 - 1.1 MG/DL    ALT (SGPT) 37 12 - 65 U/L    AST (SGOT) 33 15 - 37 U/L    Alk.  phosphatase 67 50 - 136 U/L    Protein, total 8.3 (H) 6.3 - 8.2 g/dL    Albumin 4.1 3.2 - 4.6 g/dL    Globulin 4.2 (H) 2.3 - 3.5 g/dL    A-G Ratio 1.0 (L) 1.2 - 3.5     LIPASE    Collection Time: 04/05/17  3:40 AM   Result Value Ref Range    Lipase 1203 (H) 73 - 393 U/L     No Known Allergies  Current Facility-Administered Medications   Medication Dose Route Frequency Provider Last Rate Last Dose    aspirin delayed-release tablet 81 mg  81 mg Oral DAILY Clara Schwartz MD   81 mg at 04/05/17 0900    atorvastatin (LIPITOR) tablet 20 mg  20 mg Oral QHS Clara Schwartz MD        fenofibrate (LOFIBRA) tablet 160 mg  160 mg Oral DAILY Clara Schwartz MD   160 mg at 04/05/17 0900    levothyroxine (SYNTHROID) tablet 100 mcg  100 mcg Oral ACB Clara Schwartz MD   100 mcg at 04/05/17 0753    0.9% sodium chloride infusion 3,000 mL  3,000 mL IntraVENous CONTINUOUS Clara Schwartz  mL/hr at 04/05/17 0752 3,000 mL at 04/05/17 0752    sodium chloride (NS) flush 5-10 mL  5-10 mL IntraVENous Q8H Clara Schwartz MD        sodium chloride (NS) flush 5-10 mL  5-10 mL IntraVENous PRN Clara Schwartz MD        acetaminophen (TYLENOL) tablet 650 mg  650 mg Oral Q4H PRN Clara Schwartz MD   650 mg at 04/05/17 1520    oxyCODONE IR (ROXICODONE) tablet 10 mg  10 mg Oral Q4H PRN Clara Schwartz MD        [facility]) with saline injection 12.5 mg  12.5 mg IntraVENous Q6H PRN Clara Schwartz MD   12.5 mg at 04/05/17 1000    LORazepam (ATIVAN) tablet 1 mg  1 mg Oral BID PRN Clara Schwartz MD        enoxaparin (LOVENOX) injection 40 mg  40 mg SubCUTAneous Q24H Clara Schwartz MD   40 mg at 04/05/17 0753    morphine injection 5 mg  5 mg IntraVENous Q4H PRN Clara Schwartz MD        piperacillin-tazobactam (ZOSYN) 3.375 g in 0.9% sodium chloride (MBP/ADV) 100 mL MBP  3.375 g IntraVENous Q6H Jesusita Hay Rafael Canseco., PA           Review of Systems negative with exception of pertinent positives noted above  PHYSICAL EXAM     Visit Vitals    /71 (BP 1 Location: Left arm, BP Patient Position: At rest;Supine)    Pulse (!) 107    Temp (!) 101.7 °F (38.7 °C)    Resp 16    Ht 5' (1.524 m)    Wt 68 kg (150 lb)    SpO2 93%    BMI 29.29 kg/m2      Temp (24hrs), Av.7 °F (37.6 °C), Min:98.4 °F (36.9 °C), Max:101.7 °F (38.7 °C)    Oxygen Therapy  O2 Sat (%): 93 % (17 1450)  Pulse via Oximetry: 91 beats per minute (17 0420)  O2 Device: Room air (17 0735)  No intake or output data in the 24 hours ending 17 1650   General: No acute distress    Lungs: CTA  Heart:  Tachycardia, regular   Abdomen: Soft,+BS, epigastric tenderness   Extremities: No edema   Neurologic:  Non-focal       ASSESSMENT      Active Hospital Problems    Diagnosis Date Noted    Pancreatitis, acute 2017   Plan:  Given fever and tachycardia, will obtain UA, BC, CXR, C diff,  Abdominal US and start empiric Zosyn and Vanco. Check CBC, CMP, lactic acid and procalcitonin.  Continue IVF hydration and symptomatic treatment    DVT Prophylaxis: Lovenox   Plan of Care Discussed with: Supervising MD Ama Dias., PA

## 2017-04-06 PROBLEM — A41.9 SEPSIS, UNSPECIFIED: Status: ACTIVE | Noted: 2017-04-06

## 2017-04-06 PROBLEM — E83.42 HYPOMAGNESEMIA: Status: ACTIVE | Noted: 2017-04-06

## 2017-04-06 PROBLEM — N39.0 UTI (URINARY TRACT INFECTION): Status: ACTIVE | Noted: 2017-04-06

## 2017-04-06 PROBLEM — E87.6 HYPOKALEMIA: Status: ACTIVE | Noted: 2017-04-06

## 2017-04-06 LAB
ALBUMIN SERPL BCP-MCNC: 2.9 G/DL (ref 3.2–4.6)
ALBUMIN/GLOB SERPL: 0.9 {RATIO} (ref 1.2–3.5)
ALP SERPL-CCNC: 46 U/L (ref 50–136)
ALT SERPL-CCNC: 37 U/L (ref 12–65)
ANION GAP BLD CALC-SCNC: 10 MMOL/L (ref 7–16)
APPEARANCE UR: ABNORMAL
AST SERPL W P-5'-P-CCNC: 44 U/L (ref 15–37)
BACTERIA URNS QL MICRO: ABNORMAL /HPF
BASOPHILS # BLD AUTO: 0 K/UL (ref 0–0.2)
BASOPHILS # BLD: 0 % (ref 0–2)
BILIRUB SERPL-MCNC: 0.4 MG/DL (ref 0.2–1.1)
BILIRUB UR QL: NEGATIVE
BUN SERPL-MCNC: 16 MG/DL (ref 8–23)
CALCIUM SERPL-MCNC: 8.1 MG/DL (ref 8.3–10.4)
CASTS URNS QL MICRO: ABNORMAL /LPF
CHLORIDE SERPL-SCNC: 106 MMOL/L (ref 98–107)
CHOLEST SERPL-MCNC: 128 MG/DL
CO2 SERPL-SCNC: 25 MMOL/L (ref 21–32)
COLOR UR: YELLOW
CREAT SERPL-MCNC: 0.77 MG/DL (ref 0.6–1)
DIFFERENTIAL METHOD BLD: ABNORMAL
EOSINOPHIL # BLD: 0 K/UL (ref 0–0.8)
EOSINOPHIL NFR BLD: 0 % (ref 0.5–7.8)
EPI CELLS #/AREA URNS HPF: ABNORMAL /HPF
ERYTHROCYTE [DISTWIDTH] IN BLOOD BY AUTOMATED COUNT: 14.6 % (ref 11.9–14.6)
GLOBULIN SER CALC-MCNC: 3.4 G/DL (ref 2.3–3.5)
GLUCOSE SERPL-MCNC: 110 MG/DL (ref 65–100)
GLUCOSE UR STRIP.AUTO-MCNC: NEGATIVE MG/DL
HCT VFR BLD AUTO: 30.7 % (ref 35.8–46.3)
HDLC SERPL-MCNC: 51 MG/DL (ref 40–60)
HDLC SERPL: 2.5 {RATIO}
HGB BLD-MCNC: 9.8 G/DL (ref 11.7–15.4)
HGB UR QL STRIP: ABNORMAL
IMM GRANULOCYTES # BLD: 0 K/UL (ref 0–0.5)
IMM GRANULOCYTES NFR BLD AUTO: 0.3 % (ref 0–5)
KETONES UR QL STRIP.AUTO: NEGATIVE MG/DL
LDLC SERPL CALC-MCNC: 66.8 MG/DL
LEUKOCYTE ESTERASE UR QL STRIP.AUTO: ABNORMAL
LIPASE SERPL-CCNC: 72 U/L (ref 73–393)
LIPID PROFILE,FLP: NORMAL
LYMPHOCYTES # BLD AUTO: 15 % (ref 13–44)
LYMPHOCYTES # BLD: 1 K/UL (ref 0.5–4.6)
MAGNESIUM SERPL-MCNC: 1.7 MG/DL (ref 1.8–2.4)
MCH RBC QN AUTO: 28.2 PG (ref 26.1–32.9)
MCHC RBC AUTO-ENTMCNC: 31.9 G/DL (ref 31.4–35)
MCV RBC AUTO: 88.5 FL (ref 79.6–97.8)
MONOCYTES # BLD: 0.2 K/UL (ref 0.1–1.3)
MONOCYTES NFR BLD AUTO: 4 % (ref 4–12)
NEUTS SEG # BLD: 5.5 K/UL (ref 1.7–8.2)
NEUTS SEG NFR BLD AUTO: 81 % (ref 43–78)
NITRITE UR QL STRIP.AUTO: NEGATIVE
PH UR STRIP: 5 [PH] (ref 5–9)
PHOSPHATE SERPL-MCNC: 4.3 MG/DL (ref 2.3–3.7)
PLATELET # BLD AUTO: 210 K/UL (ref 150–450)
PMV BLD AUTO: 11 FL (ref 10.8–14.1)
POTASSIUM SERPL-SCNC: 3.3 MMOL/L (ref 3.5–5.1)
PROT SERPL-MCNC: 6.3 G/DL (ref 6.3–8.2)
PROT UR STRIP-MCNC: 30 MG/DL
RBC # BLD AUTO: 3.47 M/UL (ref 4.05–5.25)
RBC #/AREA URNS HPF: ABNORMAL /HPF
SODIUM SERPL-SCNC: 141 MMOL/L (ref 136–145)
SP GR UR REFRACTOMETRY: 1.03 (ref 1–1.02)
TRIGL SERPL-MCNC: 51 MG/DL (ref 35–150)
UROBILINOGEN UR QL STRIP.AUTO: 0.2 EU/DL (ref 0.2–1)
VLDLC SERPL CALC-MCNC: 10.2 MG/DL (ref 7–27)
WBC # BLD AUTO: 6.8 K/UL (ref 4.3–11.1)
WBC URNS QL MICRO: ABNORMAL /HPF

## 2017-04-06 PROCEDURE — 74011250637 HC RX REV CODE- 250/637: Performed by: FAMILY MEDICINE

## 2017-04-06 PROCEDURE — 81001 URINALYSIS AUTO W/SCOPE: CPT

## 2017-04-06 PROCEDURE — 83735 ASSAY OF MAGNESIUM: CPT | Performed by: FAMILY MEDICINE

## 2017-04-06 PROCEDURE — 74011250637 HC RX REV CODE- 250/637

## 2017-04-06 PROCEDURE — 74011250636 HC RX REV CODE- 250/636

## 2017-04-06 PROCEDURE — 65270000029 HC RM PRIVATE

## 2017-04-06 PROCEDURE — 80053 COMPREHEN METABOLIC PANEL: CPT | Performed by: FAMILY MEDICINE

## 2017-04-06 PROCEDURE — 74011000258 HC RX REV CODE- 258

## 2017-04-06 PROCEDURE — 84100 ASSAY OF PHOSPHORUS: CPT | Performed by: FAMILY MEDICINE

## 2017-04-06 PROCEDURE — 74011250636 HC RX REV CODE- 250/636: Performed by: FAMILY MEDICINE

## 2017-04-06 PROCEDURE — 87086 URINE CULTURE/COLONY COUNT: CPT

## 2017-04-06 PROCEDURE — 83690 ASSAY OF LIPASE: CPT | Performed by: FAMILY MEDICINE

## 2017-04-06 PROCEDURE — 80061 LIPID PANEL: CPT | Performed by: FAMILY MEDICINE

## 2017-04-06 PROCEDURE — 36415 COLL VENOUS BLD VENIPUNCTURE: CPT | Performed by: FAMILY MEDICINE

## 2017-04-06 PROCEDURE — 85025 COMPLETE CBC W/AUTO DIFF WBC: CPT | Performed by: FAMILY MEDICINE

## 2017-04-06 RX ORDER — MAGNESIUM SULFATE HEPTAHYDRATE 40 MG/ML
2 INJECTION, SOLUTION INTRAVENOUS ONCE
Status: COMPLETED | OUTPATIENT
Start: 2017-04-06 | End: 2017-04-06

## 2017-04-06 RX ORDER — POTASSIUM CHLORIDE 20 MEQ/1
40 TABLET, EXTENDED RELEASE ORAL
Status: COMPLETED | OUTPATIENT
Start: 2017-04-06 | End: 2017-04-06

## 2017-04-06 RX ADMIN — VANCOMYCIN HYDROCHLORIDE 1000 MG: 1 INJECTION, POWDER, LYOPHILIZED, FOR SOLUTION INTRAVENOUS at 05:30

## 2017-04-06 RX ADMIN — PIPERACILLIN SODIUM,TAZOBACTAM SODIUM 3.38 G: 3; .375 INJECTION, POWDER, FOR SOLUTION INTRAVENOUS at 12:46

## 2017-04-06 RX ADMIN — SODIUM CHLORIDE 1000 ML: 900 INJECTION, SOLUTION INTRAVENOUS at 14:07

## 2017-04-06 RX ADMIN — LEVOTHYROXINE SODIUM 100 MCG: 100 TABLET ORAL at 08:08

## 2017-04-06 RX ADMIN — POTASSIUM CHLORIDE 40 MEQ: 20 TABLET, EXTENDED RELEASE ORAL at 19:33

## 2017-04-06 RX ADMIN — ASPIRIN 81 MG: 81 TABLET, COATED ORAL at 08:08

## 2017-04-06 RX ADMIN — ACETAMINOPHEN 650 MG: 325 TABLET, FILM COATED ORAL at 02:39

## 2017-04-06 RX ADMIN — ATORVASTATIN CALCIUM 20 MG: 10 TABLET, FILM COATED ORAL at 21:45

## 2017-04-06 RX ADMIN — OXYCODONE HYDROCHLORIDE 10 MG: 5 TABLET ORAL at 02:39

## 2017-04-06 RX ADMIN — Medication 10 ML: at 05:33

## 2017-04-06 RX ADMIN — FENOFIBRATE 160 MG: 160 TABLET ORAL at 08:08

## 2017-04-06 RX ADMIN — VANCOMYCIN HYDROCHLORIDE 1000 MG: 1 INJECTION, POWDER, LYOPHILIZED, FOR SOLUTION INTRAVENOUS at 17:08

## 2017-04-06 RX ADMIN — ACETAMINOPHEN 650 MG: 325 TABLET, FILM COATED ORAL at 09:46

## 2017-04-06 RX ADMIN — PIPERACILLIN SODIUM,TAZOBACTAM SODIUM 3.38 G: 3; .375 INJECTION, POWDER, FOR SOLUTION INTRAVENOUS at 04:33

## 2017-04-06 RX ADMIN — Medication 10 ML: at 21:46

## 2017-04-06 RX ADMIN — MAGNESIUM SULFATE IN WATER 2 G: 40 INJECTION, SOLUTION INTRAVENOUS at 19:32

## 2017-04-06 RX ADMIN — PIPERACILLIN SODIUM,TAZOBACTAM SODIUM 3.38 G: 3; .375 INJECTION, POWDER, FOR SOLUTION INTRAVENOUS at 20:47

## 2017-04-06 RX ADMIN — ENOXAPARIN SODIUM 40 MG: 40 INJECTION SUBCUTANEOUS at 08:08

## 2017-04-06 RX ADMIN — SODIUM CHLORIDE 1000 ML: 900 INJECTION, SOLUTION INTRAVENOUS at 08:13

## 2017-04-06 RX ADMIN — OXYCODONE HYDROCHLORIDE 10 MG: 5 TABLET ORAL at 14:04

## 2017-04-06 NOTE — PROGRESS NOTES
Problem: Interdisciplinary Rounds  Goal: Interdisciplinary Rounds  Interdisciplinary team rounds were held 4/6/2017 with the following team members:Care Management, Nursing, Nurse Practitioner, Nutrition, Patient Relations and Pharmacy     Plan of care discussed. See clinical pathway and/or care plan for interventions and desired outcomes.

## 2017-04-06 NOTE — PROGRESS NOTES
Pharmacokinetic Consult to Pharmacist    María Dina is a 59 y.o. female being treated for sepsis with Vancomycin and Zosyn. [unfilled]  @MICHELLE(19)@  Lab Results   Component Value Date/Time    BUN 16 04/06/2017 06:23 AM    Creatinine 0.77 04/06/2017 06:23 AM    WBC 6.8 04/06/2017 06:23 AM    Procalcitonin <0.1 04/05/2017 07:02 PM      Estimated Creatinine Clearance: 64.3 mL/min (based on Cr of 0.77). CULTURES:  All Micro Results       Procedure Component Value Units Date/Time      CULTURE, URINE [[id number]] Collected:  04/06/17 0116    Order Status:  Completed Specimen:  Urine from Clean catch Updated:  04/06/17 1008    CULTURE, BLOOD [[id number]] Collected:  04/05/17 1902    Order Status:  Completed Specimen:  Blood from Blood Updated:  04/06/17 0543     Special Requests: RIGHT ANTECUBITAL        Culture result: NO GROWTH AFTER 10 HOURS       CULTURE, BLOOD [[id number]] Collected:  04/05/17 1908    Order Status:  Completed Specimen:  Blood from Blood Updated:  04/06/17 0543     Special Requests: LEFT ANTECUBITAL        Culture result: NO GROWTH AFTER 10 HOURS       C. DIFFICILE/EPI PCR [[id number]] Collected:  04/05/17 1429    Order Status:  Completed Specimen:  Stool Updated:  04/05/17 1901     Special Requests: NO SPECIAL REQUESTS        Culture result: NEGATIVE                  Toxigenic C. diff NEGATIVE/ 027-NAP1-BI PRESUMPTIVE NEGATIVE    C. DIFFICILE/EPI PCR [[id number]]     Order Status:  Canceled Specimen:  Stool     CLOSTRIDIUM DIFF TOXIN A & B [[id number]]     Order Status:  Canceled Specimen:  Stool               No results found for: Arch Zofia    Day 2 of vancomycin. Goal trough is 15 - 20. Will continue with Vancomycin 1 gram IV every 12 hours. Trough ordered for tomorrow at 5 am.  Will continue to follow patient.       Thank you,  Stephen GeeD, BCPS

## 2017-04-06 NOTE — PROGRESS NOTES
Shift assessment complete. Pt alert and oriented x4, respirations present, even and unlabored, HOB elevated, pt denies any SOB at this time, S1&S2 auscultated, HR regular, abd soft, and nontender, pt having diarrhea, active BS in all 4 quadrants, pt is up with assistance to the bathroom, voiding, IVF infusing without difficulty, No pressure ulcers or edema noted, pt denies any pain at this time, pt instructed to call for assistance, pt verbalizes understanding, bed low and locked, side rails x3, call light within reach, family at the bedside, bed alarm on and functioning properly.

## 2017-04-06 NOTE — PROGRESS NOTES
Primary RN reports a MEW's score of 4 related to HR of 110 and temp of 102. 2. Primary RN to give Tylenol and pain medication and reassess VS. Patient is not in any distress at this time.

## 2017-04-06 NOTE — PROGRESS NOTES
650 mg of TYLENOL given for temp of 102.2    Pt requests medication for pain rated at 7/10, see MAR for administration of ROXICODONE 10 mg PO

## 2017-04-07 ENCOUNTER — APPOINTMENT (OUTPATIENT)
Dept: CT IMAGING | Age: 62
DRG: 438 | End: 2017-04-07
Attending: INTERNAL MEDICINE
Payer: COMMERCIAL

## 2017-04-07 LAB
ALBUMIN SERPL BCP-MCNC: 2.3 G/DL (ref 3.2–4.6)
ALBUMIN/GLOB SERPL: 0.6 {RATIO} (ref 1.2–3.5)
ALP SERPL-CCNC: 78 U/L (ref 50–136)
ALT SERPL-CCNC: 186 U/L (ref 12–65)
ANION GAP BLD CALC-SCNC: 9 MMOL/L (ref 7–16)
AST SERPL W P-5'-P-CCNC: 200 U/L (ref 15–37)
BASOPHILS # BLD AUTO: 0 K/UL (ref 0–0.2)
BASOPHILS # BLD: 0 % (ref 0–2)
BILIRUB SERPL-MCNC: 0.2 MG/DL (ref 0.2–1.1)
BUN SERPL-MCNC: 7 MG/DL (ref 8–23)
CALCIUM SERPL-MCNC: 7 MG/DL (ref 8.3–10.4)
CHLORIDE SERPL-SCNC: 106 MMOL/L (ref 98–107)
CO2 SERPL-SCNC: 25 MMOL/L (ref 21–32)
CREAT SERPL-MCNC: 0.56 MG/DL (ref 0.6–1)
DIFFERENTIAL METHOD BLD: ABNORMAL
EOSINOPHIL # BLD: 0 K/UL (ref 0–0.8)
EOSINOPHIL NFR BLD: 0 % (ref 0.5–7.8)
ERYTHROCYTE [DISTWIDTH] IN BLOOD BY AUTOMATED COUNT: 14.2 % (ref 11.9–14.6)
FERRITIN SERPL-MCNC: 178 NG/ML (ref 8–388)
GLOBULIN SER CALC-MCNC: 3.6 G/DL (ref 2.3–3.5)
GLUCOSE SERPL-MCNC: 84 MG/DL (ref 65–100)
HCT VFR BLD AUTO: 31.2 % (ref 35.8–46.3)
HCT VFR BLD AUTO: 31.7 % (ref 35.8–46.3)
HGB BLD-MCNC: 10.1 G/DL (ref 11.7–15.4)
HGB BLD-MCNC: 9.7 G/DL (ref 11.7–15.4)
IMM GRANULOCYTES # BLD: 0 K/UL (ref 0–0.5)
IMM GRANULOCYTES NFR BLD AUTO: 0 % (ref 0–5)
IRON SATN MFR SERPL: 5 %
IRON SERPL-MCNC: 17 UG/DL (ref 35–150)
LIPASE SERPL-CCNC: 78 U/L (ref 73–393)
LYMPHOCYTES # BLD AUTO: 30 % (ref 13–44)
LYMPHOCYTES # BLD: 0.9 K/UL (ref 0.5–4.6)
MAGNESIUM SERPL-MCNC: 1.9 MG/DL (ref 1.8–2.4)
MCH RBC QN AUTO: 27.2 PG (ref 26.1–32.9)
MCHC RBC AUTO-ENTMCNC: 30.6 G/DL (ref 31.4–35)
MCV RBC AUTO: 89 FL (ref 79.6–97.8)
MONOCYTES # BLD: 0.1 K/UL (ref 0.1–1.3)
MONOCYTES NFR BLD AUTO: 5 % (ref 4–12)
NEUTS SEG # BLD: 2 K/UL (ref 1.7–8.2)
NEUTS SEG NFR BLD AUTO: 65 % (ref 43–78)
PLATELET # BLD AUTO: 202 K/UL (ref 150–450)
PMV BLD AUTO: 10.5 FL (ref 10.8–14.1)
POTASSIUM SERPL-SCNC: 3.3 MMOL/L (ref 3.5–5.1)
PROT SERPL-MCNC: 5.9 G/DL (ref 6.3–8.2)
RBC # BLD AUTO: 3.56 M/UL (ref 4.05–5.25)
SODIUM SERPL-SCNC: 140 MMOL/L (ref 136–145)
TIBC SERPL-MCNC: 343 UG/DL (ref 250–450)
VANCOMYCIN TROUGH SERPL-MCNC: 5 UG/ML (ref 5–20)
WBC # BLD AUTO: 3.1 K/UL (ref 4.3–11.1)

## 2017-04-07 PROCEDURE — 97165 OT EVAL LOW COMPLEX 30 MIN: CPT

## 2017-04-07 PROCEDURE — 74011250637 HC RX REV CODE- 250/637: Performed by: INTERNAL MEDICINE

## 2017-04-07 PROCEDURE — 80053 COMPREHEN METABOLIC PANEL: CPT | Performed by: FAMILY MEDICINE

## 2017-04-07 PROCEDURE — 80074 ACUTE HEPATITIS PANEL: CPT | Performed by: INTERNAL MEDICINE

## 2017-04-07 PROCEDURE — 83540 ASSAY OF IRON: CPT | Performed by: INTERNAL MEDICINE

## 2017-04-07 PROCEDURE — 74011250636 HC RX REV CODE- 250/636: Performed by: FAMILY MEDICINE

## 2017-04-07 PROCEDURE — 74011250637 HC RX REV CODE- 250/637: Performed by: FAMILY MEDICINE

## 2017-04-07 PROCEDURE — 74011636320 HC RX REV CODE- 636/320: Performed by: INTERNAL MEDICINE

## 2017-04-07 PROCEDURE — 84466 ASSAY OF TRANSFERRIN: CPT | Performed by: INTERNAL MEDICINE

## 2017-04-07 PROCEDURE — 65270000029 HC RM PRIVATE

## 2017-04-07 PROCEDURE — 74011250636 HC RX REV CODE- 250/636

## 2017-04-07 PROCEDURE — 36415 COLL VENOUS BLD VENIPUNCTURE: CPT | Performed by: FAMILY MEDICINE

## 2017-04-07 PROCEDURE — 74011250636 HC RX REV CODE- 250/636: Performed by: INTERNAL MEDICINE

## 2017-04-07 PROCEDURE — 85025 COMPLETE CBC W/AUTO DIFF WBC: CPT | Performed by: FAMILY MEDICINE

## 2017-04-07 PROCEDURE — 83690 ASSAY OF LIPASE: CPT | Performed by: FAMILY MEDICINE

## 2017-04-07 PROCEDURE — 83735 ASSAY OF MAGNESIUM: CPT | Performed by: FAMILY MEDICINE

## 2017-04-07 PROCEDURE — 74011000258 HC RX REV CODE- 258: Performed by: INTERNAL MEDICINE

## 2017-04-07 PROCEDURE — 97161 PT EVAL LOW COMPLEX 20 MIN: CPT

## 2017-04-07 PROCEDURE — 82728 ASSAY OF FERRITIN: CPT | Performed by: INTERNAL MEDICINE

## 2017-04-07 PROCEDURE — 80202 ASSAY OF VANCOMYCIN: CPT | Performed by: INTERNAL MEDICINE

## 2017-04-07 PROCEDURE — 74011000258 HC RX REV CODE- 258

## 2017-04-07 PROCEDURE — C9113 INJ PANTOPRAZOLE SODIUM, VIA: HCPCS | Performed by: INTERNAL MEDICINE

## 2017-04-07 PROCEDURE — 74177 CT ABD & PELVIS W/CONTRAST: CPT

## 2017-04-07 PROCEDURE — 85018 HEMOGLOBIN: CPT | Performed by: INTERNAL MEDICINE

## 2017-04-07 PROCEDURE — 74011000250 HC RX REV CODE- 250: Performed by: INTERNAL MEDICINE

## 2017-04-07 RX ORDER — POTASSIUM CHLORIDE 20MEQ/15ML
40 LIQUID (ML) ORAL
Status: COMPLETED | OUTPATIENT
Start: 2017-04-07 | End: 2017-04-07

## 2017-04-07 RX ORDER — HYDROCODONE BITARTRATE AND HOMATROPINE METHYLBROMIDE 1.5; 5 MG/5ML; MG/5ML
5 SYRUP ORAL
Status: DISCONTINUED | OUTPATIENT
Start: 2017-04-07 | End: 2017-04-08 | Stop reason: HOSPADM

## 2017-04-07 RX ORDER — SODIUM CHLORIDE 0.9 % (FLUSH) 0.9 %
10 SYRINGE (ML) INJECTION
Status: COMPLETED | OUTPATIENT
Start: 2017-04-07 | End: 2017-04-07

## 2017-04-07 RX ADMIN — VANCOMYCIN HYDROCHLORIDE 1000 MG: 1 INJECTION, POWDER, LYOPHILIZED, FOR SOLUTION INTRAVENOUS at 06:24

## 2017-04-07 RX ADMIN — IOVERSOL 100 ML: 741 INJECTION INTRA-ARTERIAL; INTRAVENOUS at 12:43

## 2017-04-07 RX ADMIN — Medication 10 ML: at 21:23

## 2017-04-07 RX ADMIN — ACETAMINOPHEN 650 MG: 325 TABLET, FILM COATED ORAL at 00:17

## 2017-04-07 RX ADMIN — ASPIRIN 81 MG: 81 TABLET, COATED ORAL at 07:55

## 2017-04-07 RX ADMIN — Medication 10 ML: at 12:44

## 2017-04-07 RX ADMIN — DIATRIZOATE MEGLUMINE AND DIATRIZOATE SODIUM 15 ML: 660; 100 LIQUID ORAL; RECTAL at 10:48

## 2017-04-07 RX ADMIN — Medication 10 ML: at 12:55

## 2017-04-07 RX ADMIN — PIPERACILLIN SODIUM,TAZOBACTAM SODIUM 3.38 G: 3; .375 INJECTION, POWDER, FOR SOLUTION INTRAVENOUS at 21:07

## 2017-04-07 RX ADMIN — PIPERACILLIN SODIUM,TAZOBACTAM SODIUM 3.38 G: 3; .375 INJECTION, POWDER, FOR SOLUTION INTRAVENOUS at 04:50

## 2017-04-07 RX ADMIN — FENOFIBRATE 160 MG: 160 TABLET ORAL at 07:56

## 2017-04-07 RX ADMIN — PIPERACILLIN SODIUM,TAZOBACTAM SODIUM 3.38 G: 3; .375 INJECTION, POWDER, FOR SOLUTION INTRAVENOUS at 12:55

## 2017-04-07 RX ADMIN — LORAZEPAM 1 MG: 1 TABLET ORAL at 00:20

## 2017-04-07 RX ADMIN — SODIUM CHLORIDE 40 MG: 9 INJECTION, SOLUTION INTRAMUSCULAR; INTRAVENOUS; SUBCUTANEOUS at 21:13

## 2017-04-07 RX ADMIN — HYDROCODONE BITARTRATE AND HOMATROPINE METHYLBROMIDE 5 ML: 5; 1.5 SOLUTION ORAL at 22:50

## 2017-04-07 RX ADMIN — LEVOTHYROXINE SODIUM 100 MCG: 100 TABLET ORAL at 07:56

## 2017-04-07 RX ADMIN — SODIUM CHLORIDE 100 ML: 900 INJECTION, SOLUTION INTRAVENOUS at 12:43

## 2017-04-07 RX ADMIN — ENOXAPARIN SODIUM 40 MG: 40 INJECTION SUBCUTANEOUS at 07:56

## 2017-04-07 RX ADMIN — POTASSIUM CHLORIDE 40 MEQ: 20 SOLUTION ORAL at 09:00

## 2017-04-07 RX ADMIN — Medication 10 ML: at 05:17

## 2017-04-07 RX ADMIN — OXYCODONE HYDROCHLORIDE 10 MG: 5 TABLET ORAL at 07:55

## 2017-04-07 RX ADMIN — SODIUM CHLORIDE 40 MG: 9 INJECTION INTRAMUSCULAR; INTRAVENOUS; SUBCUTANEOUS at 11:58

## 2017-04-07 NOTE — PROGRESS NOTES
Hospitalist Progress Note    2017   10:28 AM  Admit Date: 2017  3:26 AM   NAME: Kiara Martin   :  1955   MRN:  [id number]   Attending: Ivana Cadet DO  PCP:  Everette Emmanuel MD  Treatment Team: Attending Provider: Ivana Cadet DO  SUBJECTIVE:       Ms. Minerva Mata is a 60 yo female admitted with abd pain/ nausea/ loose BM, diagnosed with acute pancreatitis. Additionally with UTI/fever,  Day 3 vancomycin and zosyn. BC/UCX NGTD. CXR with patchy atelectasis vs consolidation. cdiff negative. Plans for home at discharge       17 tolerant of liquids and would like to advance, still with some loose BM but without melena/ bleeding, some dyspnea and cough     Recent Results (from the past 24 hour(s))   METABOLIC PANEL, COMPREHENSIVE    Collection Time: 17  5:15 AM   Result Value Ref Range    Sodium 140 136 - 145 mmol/L    Potassium 3.3 (L) 3.5 - 5.1 mmol/L    Chloride 106 98 - 107 mmol/L    CO2 25 21 - 32 mmol/L    Anion gap 9 7 - 16 mmol/L    Glucose 84 65 - 100 mg/dL    BUN 7 (L) 8 - 23 MG/DL    Creatinine 0.56 (L) 0.6 - 1.0 MG/DL    GFR est AA >60 >60 ml/min/1.73m2    GFR est non-AA >60 >60 ml/min/1.73m2    Calcium 7.0 (L) 8.3 - 10.4 MG/DL    Bilirubin, total 0.2 0.2 - 1.1 MG/DL    ALT (SGPT) 186 (H) 12 - 65 U/L    AST (SGOT) 200 (H) 15 - 37 U/L    Alk.  phosphatase 78 50 - 136 U/L    Protein, total 5.9 (L) 6.3 - 8.2 g/dL    Albumin 2.3 (L) 3.2 - 4.6 g/dL    Globulin 3.6 (H) 2.3 - 3.5 g/dL    A-G Ratio 0.6 (L) 1.2 - 3.5     LIPASE    Collection Time: 17  5:15 AM   Result Value Ref Range    Lipase 78 73 - 393 U/L   CBC WITH AUTOMATED DIFF    Collection Time: 17  5:15 AM   Result Value Ref Range    WBC 3.1 (L) 4.3 - 11.1 K/uL    RBC 3.56 (L) 4.05 - 5.25 M/uL    HGB 9.7 (L) 11.7 - 15.4 g/dL    HCT 31.7 (L) 35.8 - 46.3 %    MCV 89.0 79.6 - 97.8 FL    MCH 27.2 26.1 - 32.9 PG    MCHC 30.6 (L) 31.4 - 35.0 g/dL    RDW 14.2 11.9 - 14.6 %    PLATELET 662 999 - 522 K/uL    MPV 10.5 (L) 10.8 - 14.1 FL    DF AUTOMATED      NEUTROPHILS 65 43 - 78 %    LYMPHOCYTES 30 13 - 44 %    MONOCYTES 5 4.0 - 12.0 %    EOSINOPHILS 0 (L) 0.5 - 7.8 %    BASOPHILS 0 0.0 - 2.0 %    IMMATURE GRANULOCYTES 0.0 0.0 - 5.0 %    ABS. NEUTROPHILS 2.0 1.7 - 8.2 K/UL    ABS. LYMPHOCYTES 0.9 0.5 - 4.6 K/UL    ABS. MONOCYTES 0.1 0.1 - 1.3 K/UL    ABS. EOSINOPHILS 0.0 0.0 - 0.8 K/UL    ABS. BASOPHILS 0.0 0.0 - 0.2 K/UL    ABS. IMM.  GRANS. 0.0 0.0 - 0.5 K/UL   VANCOMYCIN, TROUGH    Collection Time: 04/07/17  5:15 AM   Result Value Ref Range    Vancomycin,trough 5.0 5 - 20 ug/mL   MAGNESIUM    Collection Time: 04/07/17  5:15 AM   Result Value Ref Range    Magnesium 1.9 1.8 - 2.4 mg/dL       No Known Allergies  Current Facility-Administered Medications   Medication Dose Route Frequency Provider Last Rate Last Dose    potassium chloride (KAON 10%) 20 mEq/15 mL oral liquid 40 mEq  40 mEq Oral NOW Javed Rothman MD        VANCOMYCIN INFORMATION NOTE   Other Rx Dosing/Monitoring Ivana Cadet DO        aspirin delayed-release tablet 81 mg  81 mg Oral DAILY Apolinar Flores MD   81 mg at 04/07/17 0755    atorvastatin (LIPITOR) tablet 20 mg  20 mg Oral QHS Apolinar Flores MD   20 mg at 04/06/17 2145    fenofibrate (LOFIBRA) tablet 160 mg  160 mg Oral DAILY Apolinar Flores MD   160 mg at 04/07/17 0756    levothyroxine (SYNTHROID) tablet 100 mcg  100 mcg Oral ACB Apolinar Flores MD   100 mcg at 04/07/17 0756    sodium chloride (NS) flush 5-10 mL  5-10 mL IntraVENous Q8H Apolinar Flores MD   10 mL at 04/07/17 0517    sodium chloride (NS) flush 5-10 mL  5-10 mL IntraVENous PRN Apolinar Flroes MD        acetaminophen (TYLENOL) tablet 650 mg  650 mg Oral Q4H PRN Apolinar Flores MD   650 mg at 04/07/17 0017    oxyCODONE IR (ROXICODONE) tablet 10 mg  10 mg Oral Q4H PRN Apolinar Flores MD   10 mg at 04/07/17 0755    promethazine (PHENERGAN) with saline injection 12.5 mg  12.5 mg IntraVENous Q6H PRN Bethanie Silver MD   12.5 mg at 17 1000    LORazepam (ATIVAN) tablet 1 mg  1 mg Oral BID PRN Bethanie Silver MD   1 mg at 17 0020    enoxaparin (LOVENOX) injection 40 mg  40 mg SubCUTAneous Q24H Bethanie Silver MD   40 mg at 17 0756    morphine injection 5 mg  5 mg IntraVENous Q4H PRN Bethanie Silver MD        piperacillin-tazobactam (ZOSYN) 3.375 g in 0.9% sodium chloride (MBP/ADV) 100 mL  3.375 g IntraVENous Q8H Leward Faes., PA 25 mL/hr at 17 0450 3.375 g at 17 0450    vancomycin (VANCOCIN) 1,000 mg in 0.9% sodium chloride (MBP/ADV) 250 mL  1,000 mg IntraVENous Q12H Leward Faes.,  mL/hr at 17 0624 1,000 mg at 17 5054           Review of Systems as noted above in HPI   PHYSICAL EXAM     Visit Vitals    /59 (BP 1 Location: Right arm, BP Patient Position: At rest)    Pulse 85    Temp 98.5 °F (36.9 °C)    Resp 16    Ht 5' (1.524 m)    Wt 68 kg (150 lb)    SpO2 95%    BMI 29.29 kg/m2      Temp (24hrs), Av.7 °F (37.6 °C), Min:98.5 °F (36.9 °C), Max:100.9 °F (38.3 °C)    Oxygen Therapy  O2 Sat (%): 95 % (17 0749)  Pulse via Oximetry: 91 beats per minute (17 0420)  O2 Device: Room air (17 0735)    Intake/Output Summary (Last 24 hours) at 17 1028  Last data filed at 17 1939   Gross per 24 hour   Intake             3064 ml   Output              100 ml   Net             2964 ml      General: No acute distress,conversive  Lungs:  Coarse, good effort   Heart:  Regular rate and rhythm, no murmur, no edema   Abdomen: Soft, tender bilateral LQ and nondistended, normal bowel sounds  Extremities: Warm and dry         DIAGNOSTIC STUDIES      Labs and Studies from previous 24 hours have been personally reviewed by myself           Full Code    ASSESSMENT / Geeta Prasad 169 Problems    Diagnosis Date Noted    Sepsis ([facility] [address] 2017    UTI (urinary tract infection) 04/06/2017    Hypokalemia 04/06/2017    Hypomagnesemia 04/06/2017    Pancreatitis, acute 04/05/2017     -stop vancomycin, continue zosyn  -advance to GI soft diet   -CT AP to look for colitis in light of fever and lower abd pain with loose BM   -replace potassium/magnesium   -add protonix  -check hepatitis panel   -PT/OT   FEN:oral  DVT Prophylaxis: lovenox  Disposition: pending   Time spent with patient:  Care plan addressed:   Risk Assessment:  Signed By: Kelsey Alejo MD     April 7, 2017

## 2017-04-07 NOTE — PROGRESS NOTES
Problem: Mobility Impaired (Adult and Pediatric)  Goal: *Acute Goals and Plan of Care (Insert Text)      PHYSICAL THERAPY: INITIAL ASSESSMENT, TREATMENT DAY: DAY OF ASSESSMENT, AM 4/7/2017  INPATIENT: Hospital Day: 3  Payor: BLUE CROSS / [place]UE [employer]ENTIAL[place] / Product Type: CHRISTINA /      NAME/AGE/GENDER: Nataly Ahmadi is a 59 y.o. female    PRIMARY DIAGNOSIS: Pancreatitis, acute Pancreatitis, acute Pancreatitis, acute        ICD-10: Treatment Diagnosis:       · Difficulty in walking, Not elsewhere classified (R26.2)   Precaution/Allergies:  Review of patient's allergies indicates no known allergies. ASSESSMENT:      Ms. Alan Dutton presents supine on contact and reporting that she has been getting up in room by herself and using restroom. Says she slept good last night with a sleeping pill. Agreeable to get up and show therapy how she moves around. Pt independent and safe with bed mobility, transfers and gait in the hallway without an assistive device. No skilled PT interventions indicated at this time. Will discharge PT services. This section established at most recent assessment     1. INTERVENTIONS PLANNED: none      TREATMENT PLAN: Frequency/Duration: one time visit for evaluation         RECOMMENDED REHABILITATION/EQUIPMENT: (at time of discharge pending progress): none. HISTORY:   History of Present Injury/Illness (Reason for Referral):  PER MD NOTE: Patient is a 59 y.o. female who presents to the ER due to abdominal pain with n/v. Started about 7pm and just worsened. She states she could not even keep water down. Has had a cold for a couple days but otherwise in usual state of health. ER w/u shows pancreatitis. She has not had this before. She does not drink alcohol. She does have high cholesterol and is on meds for this. Denies fever/chills. Past Medical History/Comorbidities:   Ms. Alan Dutton  has a past medical history of Thyroid disease.   Ms. Alan Dutton  has a past surgical history that includes gyn and orthopaedic. Social History/Living Environment:   Home Environment: Private residence  # Steps to Enter: 2  Wheelchair Ramp: No  One/Two Story Residence: One story  Living Alone: No  Support Systems: Spouse/Significant Other/Partner  Patient Expects to be Discharged to[de-identified] Private residence  Current DME Used/Available at Home: None  Prior Level of Function/Work/Activity:  Pt lives at home, independent with gait and ADLs without assistive device. Has her own business, she does alterations. Number of Personal Factors/Comorbidities that affect the Plan of Care: 0: LOW COMPLEXITY   EXAMINATION:   Most Recent Physical Functioning:   Gross Assessment:  AROM: Within functional limits  Strength: Within functional limits               Posture:  Posture (WDL): Within defined limits  Balance:  Sitting: Intact  Standing: Intact Bed Mobility:  Supine to Sit: Independent  Sit to Supine:  (stayed up in chair)  Wheelchair Mobility:     Transfers:  Sit to Stand: Independent; Additional time  Stand to Sit: Independent  Gait:     Speed/Johana: Delayed  Step Length: Left shortened;Right shortened  Distance (ft): 150 Feet (ft)  Ambulation - Level of Assistance: Independent       Body Structures Involved:  1. None Body Functions Affected:  1. None Activities and Participation Affected:  1. None   Number of elements that affect the Plan of Care: 1-2: LOW COMPLEXITY   CLINICAL PRESENTATION:   Presentation: Stable and uncomplicated: LOW COMPLEXITY   CLINICAL DECISION MAKING:   Azeb Collazo AM-PAC 6 Clicks   Basic Mobility Inpatient Short Form  How much difficulty does the patient currently have. .. Unable A Lot A Little None   1. Turning over in bed (including adjusting bedclothes, sheets and blankets)? [ ] 1   [ ] 2   [ ] 3   [X] 4   2. Sitting down on and standing up from a chair with arms ( e.g., wheelchair, bedside commode, etc.)   [ ] 1   [ ] 2   [ ] 3   [X] 4   3.   Moving from lying on back to sitting on the side of the bed? [ ] 1   [ ] 2   [ ] 3   [X] 4   How much help from another person does the patient currently need. .. Total A Lot A Little None   4. Moving to and from a bed to a chair (including a wheelchair)? [ ] 1   [ ] 2   [ ] 3   [X] 4   5. Need to walk in hospital room? [ ] 1   [ ] 2   [ ] 3   [X] 4   6. Climbing 3-5 steps with a railing? [ ] 1   [ ] 2   [X] 3   [ ] 4   © 2007, Trustees of [address] [place] [address] [address] [id number], under license to [employer]. All rights reserved    Score:  Initial: 23 Most Recent: X (Date: -- )     Interpretation of Tool:  Represents activities that are increasingly more difficult (i.e. Bed mobility, Transfers, Gait). Score 24 23 22-20 19-15 14-10 9-7 6       Modifier CH CI CJ CK CL CM CN         · Mobility - Walking and Moving Around:               - CURRENT STATUS:    CI - 1%-19% impaired, limited or restricted               - GOAL STATUS:           CI - 1%-19% impaired, limited or restricted               - D/C STATUS:                       CI - 1%-19% impaired, limited or restricted  Payor: [place]UE CR[place] / [place] / Product Type: CHRISTINA /       Medical Necessity:     · none. Reason for Services/Other Comments:  · none.    Use of outcome tool(s) and clinical judgement create a POC that gives a: Clear prediction of patient's progress: LOW COMPLEXITY                 TREATMENT:       Pre-treatment Symptoms/Complaints:  none  Pain: Initial:   Pain Intensity 1: 0  Post Session:  0/10      Assessment/Reassessment only, no treatment provided today     Braces/Orthotics/Lines/Etc:   · O2 Device: Room air  Treatment/Session Assessment:    · Response to Treatment:  Tolerated well, pt agrees that no physical therapy needed  · Interdisciplinary Collaboration:  · Registered Nurse  · After treatment position/precautions:  · Supine in bed  · Call light within reach      · Recommendations/Intent for next treatment session:  None, will discharge PT at this time.   Total Treatment Duration:  PT Patient Time In/Time Out  Time In: 1125  Time Out: 3[address], PT

## 2017-04-07 NOTE — PROGRESS NOTES
Pt admitted with diagnosis of sepsis. Pt is not homebound and does not qualify for [facility]. Pt lives with spouse , works and was independent PTA.     Danny Franklin

## 2017-04-07 NOTE — PROGRESS NOTES
Problem: Self Care Deficits Care Plan (Adult)  Goal: *Acute Goals and Plan of Care (Insert Text)      OCCUPATIONAL THERAPY: Initial Assessment and Discharge 4/7/2017  INPATIENT: Hospital Day: 3  Payor: BLUE CROSS / [place] [employer] [place] / Product Type: CHRISTINA /      NAME/AGE/GENDER: María Arroyo is a 59 y.o. female    PRIMARY DIAGNOSIS:  Pancreatitis, acute Pancreatitis, acute Pancreatitis, acute        ICD-10: Treatment Diagnosis:        · Generalized Muscle Weakness (M62.81)   Precautions/Allergies:         Review of patient's allergies indicates no known allergies. ASSESSMENT:      Ms. Stephania Burns presents with above diagnosis and was seen for weakness. Pt was found to be independent with all self care and safe with functional mobility. Pt will do well at home with support from  at medical discharge. No further OT warranted. Thank you for this referral.                           OCCUPATIONAL PROFILE AND HISTORY:   History of Present Injury/Illness (Reason for Referral):  weakness  Past Medical History/Comorbidities:   Ms. Stephania Burns  has a past medical history of Thyroid disease. Ms. Stephania Burns  has a past surgical history that includes gyn and orthopaedic.   Social History/Living Environment:   Home Environment: Private residence  # Steps to Enter: 2  Wheelchair Ramp: No  One/Two Story Residence: One story  Living Alone: No  Support Systems: Spouse/Significant Other/Partner  Patient Expects to be Discharged to[de-identified] Private residence  Current DME Used/Available at Home: None  Prior Level of Function/Work/Activity:  Independent with job      Number of Personal Factors/Comorbidities that affect the Plan of Care: Brief history (0):  LOW COMPLEXITY   ASSESSMENT OF OCCUPATIONAL PERFORMANCE[de-identified]   Activities of Daily Living:           Basic ADLs (From Assessment) Complex ADLs (From Assessment)   Basic ADL  Feeding: Independent  Oral Facial Hygiene/Grooming: Independent  Bathing: Independent  Upper Body Dressing: Independent  Lower Body Dressing: Independent  Toileting: Independent     Grooming/Bathing/Dressing Activities of Daily Living     Cognitive Retraining  Safety/Judgement: Awareness of environment                 Functional Transfers  Toilet Transfer : Independent  Shower Transfer: Independent     Bed/Mat Mobility  Supine to Sit: Independent  Sit to Supine:  (stayed up in chair)  Sit to Stand: Independent; Additional time          Most Recent Physical Functioning:   Gross Assessment:                  Posture:  Posture (WDL): Within defined limits  Balance:  Sitting: Intact  Standing: Intact Bed Mobility:  Supine to Sit: Independent  Sit to Supine:  (stayed up in chair)  Wheelchair Mobility:     Transfers:  Sit to Stand: Independent; Additional time  Stand to Sit: Independent                    Patient Vitals for the past 6 hrs:       BP BP Patient Position SpO2 Pulse   17 1130 131/81 At rest 96 % 81   17 1516 123/66 At rest 96 % 77        Mental Status  Neurologic State: Alert  Orientation Level: Oriented X4  Cognition: Appropriate decision making  Perception: Appears intact  Perseveration: No perseveration noted  Safety/Judgement: Awareness of environment                               Physical Skills Involved:  1. none Cognitive Skills Affected (resulting in the inability to perform in a timely and safe manner): 1. none Psychosocial Skills Affected:  1. none   Number of elements that affect the Plan of Care: 1-3:  LOW COMPLEXITY   CLINICAL DECISION MAKIN [facility] Mobility Inpatient Short Form  How much help from another person does the patient currently need. .. Total A Lot A Little None   1. Putting on and taking off regular lower body clothing?   [ ] 1   [ ] 2   [ ] 3   [X] 4   2. Bathing (including washing, rinsing, drying)? [ ] 1   [ ] 2   [ ] 3   [X] 4   3. Toileting, which includes using toilet, bedpan or urinal?   [ ] 1   [ ] 2   [ ] 3   [X] 4   4. Putting on and taking off regular upper body clothing?   [ ] 1   [ ] 2   [ ] 3   [X] 4   5. Taking care of personal grooming such as brushing teeth? [ ] 1   [ ] 2   [ ] 3   [X] 4   6. Eating meals? [ ] 1   [ ] 2   [ ] 3   [X] 4   © 2007, Trustees of [address] [place] [address] [address] [id number], under license to [employer]. All rights reserved    Score:  Initial: 24 Most Recent: X (Date: -- )     Interpretation of Tool:  Represents activities that are increasingly more difficult (i.e. Bed mobility, Transfers, Gait). Score 24 23 22-20 19-15 14-10 9-7 6       Modifier CH CI CJ CK CL CM CN         · Self Care:               - CURRENT STATUS:    CH - 0% impaired, limited or restricted               - GOAL STATUS:           CH - 0% impaired, limited or restricted               - D/C STATUS:                       CH - 0% impaired, limited or restricted  Payor: BLUE CROSS / Plan: [place] [address] / Product Type: CHRISTINA /       Medical Necessity:     · n/a. Reason for Services/Other Comments:  · n/a. Use of outcome tool(s) and clinical judgement create a POC that gives a: LOW COMPLEXITY             TREATMENT:   (In addition to Assessment/Re-Assessment sessions the following treatments were rendered)      Pre-treatment Symptoms/Complaints:  No complaint of pain  Pain: Initial:   Pain Intensity 1: 0 0 Post Session:  0      Assessment/Reassessment only, no treatment provided today     Braces/Orthotics/Lines/Etc:   · O2 Device: Room air  Treatment/Session Assessment:    · Response to Treatment:  Pt up in room independently  · Interdisciplinary Collaboration:  · Physical Therapist  · Occupational Therapist  · Registered Nurse  · After treatment position/precautions:  · Supine in bed  · Bed/Chair-wheels locked  · Bed in low position  · Call light within reach  · RN notified  · Compliance with Program/Exercises: compliant all of the time. · Recommendations/Intent for next treatment session:   \"Next visit will focus on no further OT needed\".   Total Treatment Duration:  OT Patient Time In/Time Out  Time In: 1135  Time Out: [address] Frances Simon

## 2017-04-07 NOTE — PROGRESS NOTES
Shift assessment complete.  Pt alert and oriented x4, respirations present, even and unlabored, HOB elevated, pt denies any SOB at this time, S1&S2 auscultated, HR regular, abd soft, and nontender, pt having diarrhea, active BS in all 4 quadrants, pt is up with assistance to the bathroom, voiding, IVF infusing without difficulty, No pressure ulcers or edema noted, pt denies any pain at this time, pt instructed to call for assistance, pt verbalizes understanding, bed low and locked, side rails x3, call light within reach, family at the bedside.

## 2017-04-07 NOTE — PROGRESS NOTES
TYLENOL 650 mg given PO for temp of 100.9      Pt requests medication to rest, see MAR for administration of ATIVAN 1 mg PO

## 2017-04-08 VITALS
RESPIRATION RATE: 16 BRPM | OXYGEN SATURATION: 94 % | HEART RATE: 71 BPM | HEIGHT: 60 IN | TEMPERATURE: 98.8 F | WEIGHT: 150 LBS | DIASTOLIC BLOOD PRESSURE: 74 MMHG | SYSTOLIC BLOOD PRESSURE: 144 MMHG | BODY MASS INDEX: 29.45 KG/M2

## 2017-04-08 LAB
ALBUMIN SERPL BCP-MCNC: 2.7 G/DL (ref 3.2–4.6)
ALBUMIN/GLOB SERPL: 0.8 {RATIO} (ref 1.2–3.5)
ALP SERPL-CCNC: 87 U/L (ref 50–136)
ALT SERPL-CCNC: 158 U/L (ref 12–65)
ANION GAP BLD CALC-SCNC: 8 MMOL/L (ref 7–16)
AST SERPL W P-5'-P-CCNC: 126 U/L (ref 15–37)
BACTERIA SPEC CULT: NORMAL
BASOPHILS # BLD AUTO: 0 K/UL (ref 0–0.2)
BASOPHILS # BLD: 1 % (ref 0–2)
BILIRUB SERPL-MCNC: 0.2 MG/DL (ref 0.2–1.1)
BUN SERPL-MCNC: 3 MG/DL (ref 8–23)
CALCIUM SERPL-MCNC: 8.3 MG/DL (ref 8.3–10.4)
CHLORIDE SERPL-SCNC: 104 MMOL/L (ref 98–107)
CO2 SERPL-SCNC: 27 MMOL/L (ref 21–32)
CREAT SERPL-MCNC: 0.63 MG/DL (ref 0.6–1)
DIFFERENTIAL METHOD BLD: ABNORMAL
EOSINOPHIL # BLD: 0.1 K/UL (ref 0–0.8)
EOSINOPHIL NFR BLD: 2 % (ref 0.5–7.8)
ERYTHROCYTE [DISTWIDTH] IN BLOOD BY AUTOMATED COUNT: 13.8 % (ref 11.9–14.6)
GLOBULIN SER CALC-MCNC: 3.6 G/DL (ref 2.3–3.5)
GLUCOSE SERPL-MCNC: 89 MG/DL (ref 65–100)
HAV IGM SERPL QL IA: NEGATIVE
HBV CORE IGM SERPL QL IA: NEGATIVE
HBV SURFACE AG SERPL QL IA: NEGATIVE
HCT VFR BLD AUTO: 31.8 % (ref 35.8–46.3)
HCV AB S/CO SERPL IA: <0.1 S/CO RATIO (ref 0–0.9)
HGB BLD-MCNC: 10.2 G/DL (ref 11.7–15.4)
IMM GRANULOCYTES # BLD: 0 K/UL (ref 0–0.5)
IMM GRANULOCYTES NFR BLD AUTO: 0.3 % (ref 0–5)
LYMPHOCYTES # BLD AUTO: 35 % (ref 13–44)
LYMPHOCYTES # BLD: 1.3 K/UL (ref 0.5–4.6)
MCH RBC QN AUTO: 27.9 PG (ref 26.1–32.9)
MCHC RBC AUTO-ENTMCNC: 32.1 G/DL (ref 31.4–35)
MCV RBC AUTO: 86.9 FL (ref 79.6–97.8)
MONOCYTES # BLD: 0.3 K/UL (ref 0.1–1.3)
MONOCYTES NFR BLD AUTO: 9 % (ref 4–12)
NEUTS SEG # BLD: 2 K/UL (ref 1.7–8.2)
NEUTS SEG NFR BLD AUTO: 53 % (ref 43–78)
PLATELET # BLD AUTO: 211 K/UL (ref 150–450)
PMV BLD AUTO: 10.4 FL (ref 10.8–14.1)
POTASSIUM SERPL-SCNC: 3.2 MMOL/L (ref 3.5–5.1)
PROT SERPL-MCNC: 6.3 G/DL (ref 6.3–8.2)
RBC # BLD AUTO: 3.66 M/UL (ref 4.05–5.25)
SERVICE CMNT-IMP: NORMAL
SODIUM SERPL-SCNC: 139 MMOL/L (ref 136–145)
TRANSFERRIN SERPL-MCNC: 275 MG/DL (ref 202–364)
WBC # BLD AUTO: 3.7 K/UL (ref 4.3–11.1)

## 2017-04-08 PROCEDURE — 74011000258 HC RX REV CODE- 258

## 2017-04-08 PROCEDURE — 74011250637 HC RX REV CODE- 250/637: Performed by: NURSE PRACTITIONER

## 2017-04-08 PROCEDURE — 74011250636 HC RX REV CODE- 250/636: Performed by: NURSE PRACTITIONER

## 2017-04-08 PROCEDURE — 80053 COMPREHEN METABOLIC PANEL: CPT | Performed by: INTERNAL MEDICINE

## 2017-04-08 PROCEDURE — 85025 COMPLETE CBC W/AUTO DIFF WBC: CPT | Performed by: INTERNAL MEDICINE

## 2017-04-08 PROCEDURE — 74011250637 HC RX REV CODE- 250/637: Performed by: FAMILY MEDICINE

## 2017-04-08 PROCEDURE — 74011000250 HC RX REV CODE- 250: Performed by: INTERNAL MEDICINE

## 2017-04-08 PROCEDURE — 74011250636 HC RX REV CODE- 250/636: Performed by: INTERNAL MEDICINE

## 2017-04-08 PROCEDURE — C9113 INJ PANTOPRAZOLE SODIUM, VIA: HCPCS | Performed by: INTERNAL MEDICINE

## 2017-04-08 PROCEDURE — 36415 COLL VENOUS BLD VENIPUNCTURE: CPT | Performed by: INTERNAL MEDICINE

## 2017-04-08 PROCEDURE — 74011250636 HC RX REV CODE- 250/636

## 2017-04-08 RX ORDER — POTASSIUM CHLORIDE 14.9 MG/ML
20 INJECTION INTRAVENOUS
Status: DISCONTINUED | OUTPATIENT
Start: 2017-04-08 | End: 2017-04-08 | Stop reason: HOSPADM

## 2017-04-08 RX ORDER — POTASSIUM CHLORIDE 20 MEQ/1
40 TABLET, EXTENDED RELEASE ORAL
Status: COMPLETED | OUTPATIENT
Start: 2017-04-08 | End: 2017-04-08

## 2017-04-08 RX ORDER — POTASSIUM CHLORIDE 20 MEQ/1
40 TABLET, EXTENDED RELEASE ORAL DAILY
Qty: 5 TAB | Refills: 0 | Status: SHIPPED | OUTPATIENT
Start: 2017-04-08 | End: 2020-02-12

## 2017-04-08 RX ORDER — CEFPODOXIME PROXETIL 200 MG/1
200 TABLET, FILM COATED ORAL 2 TIMES DAILY
Qty: 6 TAB | Refills: 0 | Status: SHIPPED | OUTPATIENT
Start: 2017-04-08 | End: 2017-04-11

## 2017-04-08 RX ADMIN — LEVOTHYROXINE SODIUM 100 MCG: 100 TABLET ORAL at 08:10

## 2017-04-08 RX ADMIN — POTASSIUM CHLORIDE 40 MEQ: 20 TABLET, EXTENDED RELEASE ORAL at 09:46

## 2017-04-08 RX ADMIN — SODIUM CHLORIDE 40 MG: 9 INJECTION, SOLUTION INTRAMUSCULAR; INTRAVENOUS; SUBCUTANEOUS at 08:11

## 2017-04-08 RX ADMIN — PIPERACILLIN SODIUM,TAZOBACTAM SODIUM 3.38 G: 3; .375 INJECTION, POWDER, FOR SOLUTION INTRAVENOUS at 03:50

## 2017-04-08 NOTE — DISCHARGE INSTRUCTIONS
DISCHARGE SUMMARY from Nurse    The following personal items are in your possession at time of discharge:       Visual Aid: None           Clothing: At bedside                PATIENT INSTRUCTIONS:    After general anesthesia or intravenous sedation, for 24 hours or while taking prescription Narcotics:  · Limit your activities  · Do not drive and operate hazardous machinery  · Do not make important personal or business decisions  · Do  not drink alcoholic beverages  · If you have not urinated within 8 hours after discharge, please contact your surgeon on call. Report the following to your surgeon:  · Excessive pain, swelling, redness or odor of or around the surgical area  · Temperature over 100.5  · Nausea and vomiting lasting longer than 4 hours or if unable to take medications  · Any signs of decreased circulation or nerve impairment to extremity: change in color, persistent  numbness, tingling, coldness or increase pain  · Any questions        What to do at Home:  Recommended activity: Activity as tolerated     If you experience any of the following symptoms please see discharge instructions, please follow up with your primary care MD.      *  Please give a list of your current medications to your Primary Care Provider. *  Please update this list whenever your medications are discontinued, doses are      changed, or new medications (including over-the-counter products) are added. *  Please carry medication information at all times in case of emergency situations. These are general instructions for a healthy lifestyle:    No smoking/ No tobacco products/ Avoid exposure to second hand smoke    Surgeon General's Warning:  Quitting smoking now greatly reduces serious risk to your health.     Obesity, smoking, and sedentary lifestyle greatly increases your risk for illness    A healthy diet, regular physical exercise & weight monitoring are important for maintaining a healthy lifestyle    You may be retaining fluid if you have a history of heart failure or if you experience any of the following symptoms:  Weight gain of 3 pounds or more overnight or 5 pounds in a week, increased swelling in our hands or feet or shortness of breath while lying flat in bed. Please call your doctor as soon as you notice any of these symptoms; do not wait until your next office visit. Recognize signs and symptoms of STROKE:    F-face looks uneven    A-arms unable to move or move unevenly    S-speech slurred or non-existent    T-time-call 911 as soon as signs and symptoms begin-DO NOT go       Back to bed or wait to see if you get better-TIME IS BRAIN. Warning Signs of HEART ATTACK     Call 911 if you have these symptoms:   Chest discomfort. Most heart attacks involve discomfort in the center of the chest that lasts more than a few minutes, or that goes away and comes back. It can feel like uncomfortable pressure, squeezing, fullness, or pain.  Discomfort in other areas of the upper body. Symptoms can include pain or discomfort in one or both arms, the back, neck, jaw, or stomach.  Shortness of breath with or without chest discomfort.  Other signs may include breaking out in a cold sweat, nausea, or lightheadedness. Don't wait more than five minutes to call 911 - MINUTES MATTER! Fast action can save your life. Calling 911 is almost always the fastest way to get lifesaving treatment. Emergency Medical Services staff can begin treatment when they arrive -- up to an hour sooner than if someone gets to the hospital by car. The discharge information has been reviewed with the patient. The patient verbalized understanding. Discharge medications reviewed with the patient and appropriate educational materials and side effects teaching were provided.

## 2017-04-08 NOTE — PROGRESS NOTES
Shift assessment complete. Pt A/O x 3. Respirations even and unlabored. Denies SOB. Abdomen semi-soft, non tender, states she has [place] lot of gas\". Bowel sounds active in all 4 quadrants. Denies pain. No needs voiced at this time. All safety measures in place.

## 2017-04-08 NOTE — PROGRESS NOTES
Discharge instructions given to and reviewed with pt and pts daughter. Prescriptions x2 given to pt. Instructed pt to take antibiotics until all gone-pt verbalized understanding. Pt to get dressed and call when ready for discharge.

## 2017-04-08 NOTE — DISCHARGE SUMMARY
Hospitalist Discharge Summary   Patient ID:  Terressa Castleman  [id number]  59 y.o.  1955  Admit date: 4/5/2017  3:26 AM  Discharge date and time: 4/8/2017  Attending: Oniel Villatoro DO  PCP:  Sandrita Gallagher MD  Treatment Team: Attending Provider: Oniel Villatoro DO; Consulting Provider: Nancy Bass MD  Principal Diagnosis Pancreatitis, acute   Principal Problem:    Pancreatitis, acute (4/5/2017)    Active Problems:    Sepsis ([facility] [address] (4/6/2017)      UTI (urinary tract infection) (4/6/2017)      Hypokalemia (4/6/2017)      Hypomagnesemia (4/6/2017)       * Admission Diagnoses: Pancreatitis, acute  * Discharge Diagnoses:    Hospital Problems as of 4/8/2017  Date Reviewed: 5/23/2016          Codes Class Noted - Resolved POA    Sepsis ([facility] [address] ICD-10-CM: A41.9  ICD-9-CM: 995.91  4/6/2017 - Present Unknown        UTI (urinary tract infection) ICD-10-CM: N39.0  ICD-9-CM: 599.0  4/6/2017 - Present Unknown        Hypokalemia ICD-10-CM: E87.6  ICD-9-CM: 276.8  4/6/2017 - Present Unknown        Hypomagnesemia ICD-10-CM: E83.42  ICD-9-CM: 275.2  4/6/2017 - Present Unknown        * (Principal)Pancreatitis, acute ICD-10-CM: K85.90  ICD-9-CM: 459.6  4/5/2017 - Present Yes                Hospital Course:  Ms. Ro Snyder is a 58 yo female who presented with c/o abd pain/nausea and diarrhea. Was found to have elevated lipase, admitted with pancreatitis. Found to have UTI, was febrile. CT abd without acute findings, did show atelectasis. Cdiff negative. Has been hypokalemic. BC and urine cx NGTD. Hepatitis panel neg. Started on Zosyn. Today denies CP, SOB, abd pain, n/v/d, melena. Up in bed eating eggs, valverde, grits without difficulty.       Diagnostic Study/Procedure results summary copied from within [facility] EMR:  CT ABDOMEN AND PELVIS WITH CONTRAST     HISTORY: Abd pain severe, fever or vomiting or leukocytosis, suspect Crohn dz,  initial presentation, 59 years Female CT AP to look for colitis in light of  fever and MILD ? bilateral LQ and nondistended ABD PAIN X A FEW DAYS with loose  BM      COMPARISON: None available     TECHNIQUE: Oral contrast was administered. 125 cc of nonionic intravenous  contrast was injected, and axial helical CT images were obtained from above the  diaphragm through the pelvis. Coronal reformatted images were obtained at the  scanner console and made available for review. Radiation dose reduction  techniques were used for this study: Our CT scanners use one or all of the  following: Automated exposure control, adjustment of the mA and/or kVp according  to patient's size, iterative reconstruction.     FINDINGS:     ABDOMEN:  Mild dependent subsegmental atelectasis bilateral lung bases. Evidence of  cholecystectomy. Normal-appearing liver, spleen, pancreas, bilateral adrenal  glands and right kidney. Small simple appearing left renal interpolar cortical  cyst. Mild calcific atherosclerosis of a normal caliber abdominal aorta. No  evidence of significant lymphadenopathy. Normal-appearing small bowel. No  evidence of intraperitoneal free air or free fluid.     PELVIS:  Normal-appearing urinary bladder. Normal-appearing atrophic uterus and  bilateral adnexa. Normal-appearing colon and partially visualized appendix. Normal-appearing terminal ileum. No evidence of pelvic free fluid. No evidence  of significant inguinal or pelvic sidewall lymphadenopathy. Visualized osseous  structures unremarkable.     IMPRESSION  IMPRESSION:   No acute pathology identified. Other incidental findings as above.       PA and lateral chest radiographs     History: fever, 61 years Female Fever and cough starting today. Mild sob  starting today     Comparison: None available     Findings: Normal cardiomediastinal silhouette. Low lung volumes. Mild patchy  bibasilar airspace opacities could represent developing atelectasis and or  consolidation. No evidence of pneumothorax, pleural effusion.  Visualized soft  tissue and osseous structures otherwise unremarkable.      IMPRESSION  Impression: Low lung volumes with patchy bibasilar atelectasis and or  consolidation. Examination: Abdominal ultrasound complete     History: acute pancreatitis, 59 years Female with epigastric and right upper  quadrant severe pain for 2 days     Comparison: None available     Findings: Liver has a diffusely hyperechoic echotexture with no evidence of  focal mass or intrahepatic ductal dilatation, the liver measures 19.1 CM in  mid-clavicular length. Normal hepatopedal flow within the main portal vein. Gallbladder is surgically absent. Proximal common duct measures 6 mm. No  evidence of renal calculi, hydronephrosis, or focal scarring. The right kidney  measures approximately 10.4 cm. The left kidney measures approximately 10.6 cm. There is a small simple appearing left renal interpolar region cortical cyst  measuring 1.4 x 1.5 x 1.2 cm, without flow. Partially visualized head, body of  pancreas unremarkable. Visualized portions of the aorta are normal in caliber. Proximal IVC is patent. Spleen appears unremarkable and measures approximately  9.6 cm. No ascites or pleural effusions. Technically difficult exam due to  patient inability to take deep breath holds.     IMPRESSION  Impression:     1. Diffusely hyperechoic liver, most likely representing hepatic steatosis. 2. Status post cholecystectomy.   3. Small simple appearing left renal cortical cyst.    Labs: Results:       Chemistry Recent Labs      04/08/17   0648  04/07/17   0515  04/06/17   0623   GLU  89  84  110*   NA  139  140  141   K  3.2*  3.3*  3.3*   CL  104  106  106   CO2  27  25  25   BUN  3*  7*  16   CREA  0.63  0.56*  0.77   CA  8.3  7.0*  8.1*   AGAP  8  9  10   TBILI  0.2  0.2  0.4   ALT  158*  186*  37   AP  87  78  46*   TP  6.3  5.9*  6.3   ALB  2.7*  2.3*  2.9*   GLOB  3.6*  3.6*  3.4   AGRAT  0.8*  0.6*  0.9*      CBC w/Diff Recent Labs      04/08/17   7969 04/07/17 2000 04/07/17 0515  04/06/17   0623   WBC  3.7*   --   3.1*  6.8   RBC  3.66*   --   3.56*  3.47*   HGB  10.2*  10.1*  9.7*  9.8*   HCT  31.8*  31.2*  31.7*  30.7*   PLT  211   --   202  210   GRANS  53   --   65  81*   LYMPH  35   --   30  15   EOS  2   --   0*  0*      Cardiac Enzymes No results for input(s): CPK, CKND1, ESTHER in the last 72 hours. No lab exists for component: CKRMB, TROIP   Coagulation No results for input(s): PTP, INR, APTT in the last 72 hours. No lab exists for component: INREXT    Lipid Panel @BRIEFLAB(CHOL,CHOLPOCT,407462,068983,[id number],CHOLX,CHOLP,CHLST,CHOLV,627102,HDL,HDLPOC,HDLPOCT,409732,NHDLCT,[id number],HDLC,HDLP,LDL,LDLPOCT,LDLCPOC,939304,NLDLCT,DLDL,LDLC,DLDLP,948107,VLDLC,VLDL,TGL,TGLX,TRIGL,[id number],TRIGP,TGLPOCT,131792,085489,CHHD,CHHDX)@   BNP No results for input(s): BNPP in the last 72 hours. Liver Enzymes Recent Labs      04/08/17   0648   TP  6.3   ALB  2.7*   AP  87   SGOT  126*      Thyroid Studies No results found for: T4, T3U, TSH, TSHEXT         Discharge Exam:  Visit Vitals    /74 (BP 1 Location: Right arm, BP Patient Position: At rest)    Pulse 71    Temp 98.8 °F (37.1 °C)    Resp 16    Ht 5' (1.524 m)    Wt 68 kg (150 lb)    SpO2 94%    BMI 29.29 kg/m2     General appearance: alert, cooperative, no distress, appears stated age  Lungs: clear to auscultation bilaterally, resp even and nonlabored  Heart: regular rate and rhythm, no murmurs rubs gallops. No edema  Abdomen: soft, non-tender. Bowel sounds normal.   Extremities: no cyanosis or edema  Psych: A/O X3  Neuro:  No focal deficits      Disposition: home  Discharge Condition: stable  Patient Instructions:   Current Discharge Medication List      START taking these medications    Details   cefpodoxime (VANTIN) 200 mg tablet Take 1 Tab by mouth two (2) times a day for 3 days.   Qty: 6 Tab, Refills: 0      potassium chloride (K-DUR, KLOR-CON) 20 mEq tablet Take 2 Tabs by mouth daily.  Qty: 5 Tab, Refills: 0         CONTINUE these medications which have NOT CHANGED    Details   fenofibrate (LOFIBRA) 160 mg tablet TAKE 1 TABLET BY MOUTH EVERY DAY  Qty: 90 Tab, Refills: 4      levothyroxine (SYNTHROID) 100 mcg tablet Take 1 Tab by mouth Daily (before breakfast). Qty: 90 Tab, Refills: 3    Associated Diagnoses: Hypothyroidism, adult      atorvastatin (LIPITOR) 20 mg tablet Take 1 Tab by mouth daily. Qty: 90 Tab, Refills: 3      aspirin delayed-release (ASPIRIN LOW DOSE) 81 mg tablet Take 1 Tab by mouth daily. Qty: 90 Tab, Refills: 12    Associated Diagnoses: Hyperlipidemia LDL goal < 130      acetaminophen (TYLENOL) 500 mg tablet Take  by mouth every six (6) hours as needed for Pain.          STOP taking these medications       amoxicillin-clavulanate (AUGMENTIN) 875-125 mg per tablet Comments:   Reason for Stopping:               Activity: Activity as tolerated  Diet: low fat   Wound Care: None needed      Full Code   Surrogate decision maker:   Pneumonia and flu vaccine to be administered at discharge per hospital protocol     Notes, labs, VS, diagnostic testing reviewed  Case discussed with Supervising MD, care team, pt    Follow-up  ·   PCP 2 days  · GI 1 week  · DC with Vantin to complete abx course for UTI      Time spent to discharge patient 45 min  Signed:  Lulu Parson NP  4/8/2017  10:47 AM

## 2017-04-10 LAB
BACTERIA SPEC CULT: NORMAL
BACTERIA SPEC CULT: NORMAL
SERVICE CMNT-IMP: NORMAL
SERVICE CMNT-IMP: NORMAL

## 2017-05-22 ENCOUNTER — HOSPITAL ENCOUNTER (OUTPATIENT)
Dept: MRI IMAGING | Age: 62
Discharge: HOME | End: 2017-05-22
Attending: NURSE PRACTITIONER
Payer: COMMERCIAL

## 2017-05-22 DIAGNOSIS — R10.84 ABDOMINAL PAIN, GENERALIZED: ICD-10-CM

## 2017-05-22 PROCEDURE — 74181 MRI ABDOMEN W/O CONTRAST: CPT

## 2019-08-27 ENCOUNTER — HOSPITAL ENCOUNTER (OUTPATIENT)
Dept: GENERAL RADIOLOGY | Age: 64
Discharge: HOME | End: 2019-08-27
Attending: FAMILY MEDICINE
Payer: MEDICARE

## 2019-08-27 DIAGNOSIS — R05.9 COUGH: ICD-10-CM

## 2019-08-27 PROCEDURE — 71046 X-RAY EXAM CHEST 2 VIEWS: CPT

## 2020-02-15 ENCOUNTER — HOSPITAL ENCOUNTER (OUTPATIENT)
Dept: MAMMOGRAPHY | Age: 65
Discharge: HOME | End: 2020-02-15
Attending: FAMILY MEDICINE
Payer: MEDICARE

## 2020-02-15 DIAGNOSIS — Z78.0 POSTMENOPAUSAL STATE: ICD-10-CM

## 2020-02-15 PROCEDURE — 77080 DXA BONE DENSITY AXIAL: CPT

## 2020-02-16 NOTE — PROGRESS NOTES
Bone density is low but not osteoporosis.  Continue 1200 mg of calcium and 400 units of vitamin D every day

## 2022-03-18 PROBLEM — K85.90 PANCREATITIS, ACUTE: Status: ACTIVE | Noted: 2017-04-05

## 2022-03-19 PROBLEM — E83.42 HYPOMAGNESEMIA: Status: ACTIVE | Noted: 2017-04-06

## 2022-03-19 PROBLEM — E87.6 HYPOKALEMIA: Status: ACTIVE | Noted: 2017-04-06

## 2022-03-19 PROBLEM — E78.5 HYPERLIPIDEMIA: Status: ACTIVE | Noted: 2017-04-05

## 2022-03-20 PROBLEM — N39.0 UTI (URINARY TRACT INFECTION): Status: ACTIVE | Noted: 2017-04-06

## 2023-01-20 NOTE — PROGRESS NOTES
Hospitalist Progress Note    2017  Admit Date: 2017  3:26 AM   NAME: Latrice Hanley   :  1955   MRN:  [id number]   Attending: Marie Hebert DO  PCP:  Myrtle Shaffer MD  Treatment Team: Attending Provider: Marie Hebert DO    Full Code   SUBJECTIVE:   Latrice Hanley is a 59 y.o. female admitted with acute pancreatitis associated with 5/10 abdominal pain. She was placed on IVF hydration and sips of clear liquids. Yesterday morning she developed fever and tachycardia. Zosyn and Vanco were started. UA abnormal and CXR shows atelectasis and/or consolidation. She complains dry cough, denies dysuria. She has chronic diarrhea that she reports is unchanged. C diff negative. Procal <0.1, Lactic acid 0.8,  BC negative thus far. No leukocytosis. Today, she reports feeling better, pain improved to 4/10 and requesting diet to be advanced. Past Medical History:   Diagnosis Date    Thyroid disease      Recent Results (from the past 24 hour(s))   C.  DIFFICILE/EPI PCR    Collection Time: 17  2:29 PM   Result Value Ref Range    Special Requests: NO SPECIAL REQUESTS      Culture result: NEGATIVE       Culture result:        Toxigenic C. diff NEGATIVE/ [id number] PRESUMPTIVE NEGATIVE   CULTURE, BLOOD    Collection Time: 17  7:02 PM   Result Value Ref Range    Special Requests: RIGHT ANTECUBITAL      Culture result: NO GROWTH AFTER 10 HOURS     PROCALCITONIN    Collection Time: 17  7:02 PM   Result Value Ref Range    Procalcitonin <0.1 ng/mL   CBC WITH AUTOMATED DIFF    Collection Time: 17  7:02 PM   Result Value Ref Range    WBC 6.4 4.3 - 11.1 K/uL    RBC 4.05 4.05 - 5.25 M/uL    HGB 11.6 (L) 11.7 - 15.4 g/dL    HCT 35.7 (L) 35.8 - 46.3 %    MCV 88.1 79.6 - 97.8 FL    MCH 28.6 26.1 - 32.9 PG    MCHC 32.5 31.4 - 35.0 g/dL    RDW 14.1 11.9 - 14.6 %    PLATELET 474 190 - 287 K/uL    MPV 10.7 (L) 10.8 - 14.1 FL    DF AUTOMATED      NEUTROPHILS 84 (H) 43 - 78 %    LYMPHOCYTES 10 (L) 13 - 44 %    MONOCYTES 6 4.0 - 12.0 %    EOSINOPHILS 0 (L) 0.5 - 7.8 %    BASOPHILS 0 0.0 - 2.0 %    IMMATURE GRANULOCYTES 0.2 0.0 - 5.0 %    ABS. NEUTROPHILS 5.3 1.7 - 8.2 K/UL    ABS. LYMPHOCYTES 0.6 0.5 - 4.6 K/UL    ABS. MONOCYTES 0.4 0.1 - 1.3 K/UL    ABS. EOSINOPHILS 0.0 0.0 - 0.8 K/UL    ABS. BASOPHILS 0.0 0.0 - 0.2 K/UL    ABS. IMM. GRANS. 0.0 0.0 - 0.5 K/UL   METABOLIC PANEL, COMPREHENSIVE    Collection Time: 04/05/17  7:02 PM   Result Value Ref Range    Sodium 140 136 - 145 mmol/L    Potassium 3.4 (L) 3.5 - 5.1 mmol/L    Chloride 106 98 - 107 mmol/L    CO2 25 21 - 32 mmol/L    Anion gap 9 7 - 16 mmol/L    Glucose 115 (H) 65 - 100 mg/dL    BUN 30 (H) 8 - 23 MG/DL    Creatinine 0.65 0.6 - 1.0 MG/DL    GFR est AA >60 >60 ml/min/1.73m2    GFR est non-AA >60 >60 ml/min/1.73m2    Calcium 8.3 8.3 - 10.4 MG/DL    Bilirubin, total 0.2 0.2 - 1.1 MG/DL    ALT (SGPT) 46 12 - 65 U/L    AST (SGOT) 50 (H) 15 - 37 U/L    Alk.  phosphatase 60 50 - 136 U/L    Protein, total 7.3 6.3 - 8.2 g/dL    Albumin 3.5 3.2 - 4.6 g/dL    Globulin 3.8 (H) 2.3 - 3.5 g/dL    A-G Ratio 0.9 (L) 1.2 - 3.5     LIPASE    Collection Time: 04/05/17  7:02 PM   Result Value Ref Range    Lipase 204 73 - 393 U/L   CULTURE, BLOOD    Collection Time: 04/05/17  7:08 PM   Result Value Ref Range    Special Requests: LEFT ANTECUBITAL      Culture result: NO GROWTH AFTER 10 HOURS     LACTIC ACID, PLASMA    Collection Time: 04/05/17  7:08 PM   Result Value Ref Range    Lactic acid 0.8 0.4 - 2.0 MMOL/L   URINALYSIS W/ RFLX MICROSCOPIC    Collection Time: 04/06/17  1:16 AM   Result Value Ref Range    Color YELLOW      Appearance CLOUDY      Specific gravity 1.027 (H) 1.001 - 1.023      pH (UA) 5.0 5.0 - 9.0      Protein 30 (A) NEG mg/dL    Glucose NEGATIVE  mg/dL    Ketone NEGATIVE  NEG mg/dL    Bilirubin NEGATIVE  NEG      Blood TRACE (A) NEG      Urobilinogen 0.2 0.2 - 1.0 EU/dL    Nitrites NEGATIVE  NEG      Leukocyte Esterase MODERATE (A) NEG WBC  0 /hpf    RBC 0-3 0 /hpf    Epithelial cells 0-3 0 /hpf    Bacteria TRACE 0 /hpf    Casts 7-12 0 /lpf   METABOLIC PANEL, COMPREHENSIVE    Collection Time: 04/06/17  6:23 AM   Result Value Ref Range    Sodium 141 136 - 145 mmol/L    Potassium 3.3 (L) 3.5 - 5.1 mmol/L    Chloride 106 98 - 107 mmol/L    CO2 25 21 - 32 mmol/L    Anion gap 10 7 - 16 mmol/L    Glucose 110 (H) 65 - 100 mg/dL    BUN 16 8 - 23 MG/DL    Creatinine 0.77 0.6 - 1.0 MG/DL    GFR est AA >60 >60 ml/min/1.73m2    GFR est non-AA >60 >60 ml/min/1.73m2    Calcium 8.1 (L) 8.3 - 10.4 MG/DL    Bilirubin, total 0.4 0.2 - 1.1 MG/DL    ALT (SGPT) 37 12 - 65 U/L    AST (SGOT) 44 (H) 15 - 37 U/L    Alk.  phosphatase 46 (L) 50 - 136 U/L    Protein, total 6.3 6.3 - 8.2 g/dL    Albumin 2.9 (L) 3.2 - 4.6 g/dL    Globulin 3.4 2.3 - 3.5 g/dL    A-G Ratio 0.9 (L) 1.2 - 3.5     LIPID PANEL    Collection Time: 04/06/17  6:23 AM   Result Value Ref Range    LIPID PROFILE          Cholesterol, total 128 <200 MG/DL    Triglyceride 51 35 - 150 MG/DL    HDL Cholesterol 51 40 - 60 MG/DL    LDL, calculated 66.8 <100 MG/DL    VLDL, calculated 10.2 7.0 - 27.0 MG/DL    CHOL/HDL Ratio 2.5     LIPASE    Collection Time: 04/06/17  6:23 AM   Result Value Ref Range    Lipase 72 (L) 73 - 393 U/L   MAGNESIUM    Collection Time: 04/06/17  6:23 AM   Result Value Ref Range    Magnesium 1.7 (L) 1.8 - 2.4 mg/dL   PHOSPHORUS    Collection Time: 04/06/17  6:23 AM   Result Value Ref Range    Phosphorus 4.3 (H) 2.3 - 3.7 MG/DL   CBC WITH AUTOMATED DIFF    Collection Time: 04/06/17  6:23 AM   Result Value Ref Range    WBC 6.8 4.3 - 11.1 K/uL    RBC 3.47 (L) 4.05 - 5.25 M/uL    HGB 9.8 (L) 11.7 - 15.4 g/dL    HCT 30.7 (L) 35.8 - 46.3 %    MCV 88.5 79.6 - 97.8 FL    MCH 28.2 26.1 - 32.9 PG    MCHC 31.9 31.4 - 35.0 g/dL    RDW 14.6 11.9 - 14.6 %    PLATELET 240 041 - 598 K/uL    MPV 11.0 10.8 - 14.1 FL    DF AUTOMATED      NEUTROPHILS 81 (H) 43 - 78 %    LYMPHOCYTES 15 13 - 44 % MONOCYTES 4 4.0 - 12.0 %    EOSINOPHILS 0 (L) 0.5 - 7.8 %    BASOPHILS 0 0.0 - 2.0 %    IMMATURE GRANULOCYTES 0.3 0.0 - 5.0 %    ABS. NEUTROPHILS 5.5 1.7 - 8.2 K/UL    ABS. LYMPHOCYTES 1.0 0.5 - 4.6 K/UL    ABS. MONOCYTES 0.2 0.1 - 1.3 K/UL    ABS. EOSINOPHILS 0.0 0.0 - 0.8 K/UL    ABS. BASOPHILS 0.0 0.0 - 0.2 K/UL    ABS. IMM.  GRANS. 0.0 0.0 - 0.5 K/UL     No Known Allergies  Current Facility-Administered Medications   Medication Dose Route Frequency Provider Last Rate Last Dose    aspirin delayed-release tablet 81 mg  81 mg Oral DAILY Sudheer Chang MD   81 mg at 04/06/17 2900    atorvastatin (LIPITOR) tablet 20 mg  20 mg Oral QHS Sudheer Chang MD   20 mg at 04/05/17 2128    fenofibrate (LOFIBRA) tablet 160 mg  160 mg Oral DAILY Sudheer Chang MD   160 mg at 04/06/17 6298    levothyroxine (SYNTHROID) tablet 100 mcg  100 mcg Oral ACB Sudheer Chang MD   100 mcg at 04/06/17 8928    0.9% sodium chloride infusion 3,000 mL  3,000 mL IntraVENous CONTINUOUS Sudheer Chang  mL/hr at 04/06/17 0813 1,000 mL at 04/06/17 0813    sodium chloride (NS) flush 5-10 mL  5-10 mL IntraVENous Q8H Sudheer Chang MD   10 mL at 04/06/17 0533    sodium chloride (NS) flush 5-10 mL  5-10 mL IntraVENous PRN Sudheer Chang MD        acetaminophen (TYLENOL) tablet 650 mg  650 mg Oral Q4H PRN Sudheer Chang MD   650 mg at 04/06/17 0946    oxyCODONE IR (ROXICODONE) tablet 10 mg  10 mg Oral Q4H PRN Sudheer Chang MD   10 mg at 04/06/17 0239    promethazine (PHENERGAN) with saline injection 12.5 mg  12.5 mg IntraVENous Q6H PRN Sudheer Chang MD   12.5 mg at 04/05/17 1000    LORazepam (ATIVAN) tablet 1 mg  1 mg Oral BID PRN Sudheer Chang MD        enoxaparin (LOVENOX) injection 40 mg  40 mg SubCUTAneous Q24H Sudheer Chang MD   40 mg at 04/06/17 2976    morphine injection 5 mg  5 mg IntraVENous Q4H PRN Sudheer Chang MD        piperacillin-tazobactam (ZOSYN) 3.375 g in 0.9% sodium chloride (MBP/ADV) 100 mL  3.375 g IntraVENous Q8H KITTY Oleary 25 mL/hr at 17 0433 3.375 g at 17 0433    vancomycin (VANCOCIN) 1,000 mg in 0.9% sodium chloride (MBP/ADV) 250 mL  1,000 mg IntraVENous Q12H KITTY Oleary 250 mL/hr at 17 0530 1,000 mg at 17 0530       Review of Systems negative with exception of pertinent positives noted above  PHYSICAL EXAM     Visit Vitals    /55 (BP 1 Location: Right arm, BP Patient Position: At rest;Supine)    Pulse 94    Temp (!) 100.5 °F (38.1 °C)    Resp 16    Ht 5' (1.524 m)    Wt 68 kg (150 lb)    SpO2 94%    BMI 29.29 kg/m2      Temp (24hrs), Av.8 °F (38.2 °C), Min:99.4 °F (37.4 °C), Max:102.2 °F (39 °C)    Oxygen Therapy  O2 Sat (%): 94 % (17 0747)  Pulse via Oximetry: 91 beats per minute (17 0420)  O2 Device: Room air (17 0735)    Intake/Output Summary (Last 24 hours) at 17 1048  Last data filed at 17 0229   Gross per 24 hour   Intake             1648 ml   Output              130 ml   Net             1518 ml      General: No acute distress    Lungs: CTA  Heart:  RRR  Abdomen: Soft, +BS, epigastric tenderness   Extremities: No edema   Neurologic:  Non-focal     Results summary of Diagnostic Studies/Procedures copied from within [facility] EMR:   Abdominal US  1. Diffusely hyperechoic liver, most likely representing hepatic steatosis. 2. Status post cholecystectomy. 3. Small simple appearing left renal cortical cyst.    CXR  Low lung volumes with patchy bibasilar atelectasis and or consolidation.        ASSESSMENT      Active Hospital Problems    Diagnosis Date Noted    Sepsis ([address] 2017    UTI (urinary tract infection) 2017    Hypokalemia 2017    Hypomagnesemia 2017    Pancreatitis, acute 2017     Plan:  Continue Zosyn and Vanco and follow cultures   Continue IVF hydration   Advance to clear liquid diet Quality 110: Preventive Care And Screening: Influenza Immunization: Influenza Immunization previously received during influenza season Detail Level: Detailed Potassium and magnesium supplement   Symptomatic treatment   DVT Prophylaxis: lovenox   Plan of Care Discussed with: Supervising MD Oneil Guzman., PA

## 2023-11-15 ENCOUNTER — HOSPITAL ENCOUNTER (OUTPATIENT)
Dept: MAMMOGRAPHY | Age: 68
Discharge: HOME | End: 2023-11-18

## 2023-11-15 DIAGNOSIS — M81.0 AGE-RELATED OSTEOPOROSIS WITHOUT CURRENT PATHOLOGICAL FRACTURE: ICD-10-CM

## 2023-11-27 ENCOUNTER — HOSPITAL ENCOUNTER (OUTPATIENT)
Dept: MAMMOGRAPHY | Age: 68
Discharge: HOME | End: 2023-11-30
Payer: MEDICARE

## 2023-11-27 PROCEDURE — 77080 DXA BONE DENSITY AXIAL: CPT

## 2024-10-09 ENCOUNTER — TRANSCRIBE ORDERS (OUTPATIENT)
Dept: SCHEDULING | Age: 69
End: 2024-10-09

## 2024-10-09 DIAGNOSIS — R19.00 ABDOMINAL SWELLING: Primary | ICD-10-CM

## 2025-02-04 ENCOUNTER — TRANSCRIBE ORDERS (OUTPATIENT)
Dept: SCHEDULING | Age: 70
End: 2025-02-04

## 2025-02-04 DIAGNOSIS — M54.16 RADICULOPATHY, LUMBAR REGION: Primary | ICD-10-CM

## 2025-03-25 ENCOUNTER — HOSPITAL ENCOUNTER (OUTPATIENT)
Dept: ULTRASOUND IMAGING | Age: 70
Discharge: HOME | End: 2025-03-28
Payer: MEDICARE

## 2025-03-25 DIAGNOSIS — N28.1 ACQUIRED CYST OF KIDNEY: ICD-10-CM

## 2025-03-25 PROCEDURE — 76770 US EXAM ABDO BACK WALL COMP: CPT
